# Patient Record
Sex: MALE | Race: WHITE | NOT HISPANIC OR LATINO | Employment: OTHER | URBAN - METROPOLITAN AREA
[De-identification: names, ages, dates, MRNs, and addresses within clinical notes are randomized per-mention and may not be internally consistent; named-entity substitution may affect disease eponyms.]

---

## 2023-08-30 ENCOUNTER — HOSPITAL ENCOUNTER (OUTPATIENT)
Dept: DATA CONVERSION | Facility: HOSPITAL | Age: 88
Discharge: HOME | End: 2023-08-30
Payer: MEDICARE

## 2023-08-30 DIAGNOSIS — E78.5 HYPERLIPIDEMIA, UNSPECIFIED: ICD-10-CM

## 2023-08-30 LAB
ALBUMIN SERPL-MCNC: 4 GM/DL (ref 3.5–5)
ALBUMIN/GLOB SERPL: 1.1 RATIO (ref 1.5–3)
ALP BLD-CCNC: 74 U/L (ref 35–125)
ALT SERPL-CCNC: 15 U/L (ref 5–40)
ANION GAP SERPL CALCULATED.3IONS-SCNC: 12 MMOL/L (ref 0–19)
APPEARANCE PLAS: ABNORMAL
AST SERPL-CCNC: 18 U/L (ref 5–40)
BILIRUB SERPL-MCNC: 0.4 MG/DL (ref 0.1–1.2)
BUN SERPL-MCNC: 14 MG/DL (ref 8–25)
BUN/CREAT SERPL: 11.7 RATIO (ref 8–21)
CALCIUM SERPL-MCNC: 9.1 MG/DL (ref 8.5–10.4)
CHLORIDE SERPL-SCNC: 103 MMOL/L (ref 97–107)
CHOLEST SERPL-MCNC: 123 MG/DL (ref 133–200)
CHOLEST/HDLC SERPL: 3.2 RATIO
CO2 SERPL-SCNC: 23 MMOL/L (ref 24–31)
COLOR SPUN FLD: ABNORMAL
CREAT SERPL-MCNC: 1.2 MG/DL (ref 0.4–1.6)
FASTING STATUS PATIENT QL REPORTED: ABNORMAL
GFR SERPL CREATININE-BSD FRML MDRD: 58 ML/MIN/1.73 M2
GLOBULIN SER-MCNC: 3.5 G/DL (ref 1.9–3.7)
GLUCOSE SERPL-MCNC: 119 MG/DL (ref 65–99)
HDLC SERPL-MCNC: 39 MG/DL
LDLC SERPL CALC-MCNC: 63 MG/DL (ref 65–130)
POTASSIUM SERPL-SCNC: 4.2 MMOL/L (ref 3.4–5.1)
PROT SERPL-MCNC: 7.5 G/DL (ref 5.9–7.9)
SODIUM SERPL-SCNC: 138 MMOL/L (ref 133–145)
TRIGL SERPL-MCNC: 105 MG/DL (ref 40–150)

## 2023-09-13 PROBLEM — N40.1 BENIGN PROSTATIC HYPERPLASIA WITH URINARY OBSTRUCTION: Status: ACTIVE | Noted: 2023-09-13

## 2023-09-13 PROBLEM — A41.9 SEPSIS (MULTI): Status: ACTIVE | Noted: 2023-09-13

## 2023-09-13 PROBLEM — N18.2 STAGE 2 CHRONIC KIDNEY DISEASE: Status: ACTIVE | Noted: 2023-09-13

## 2023-09-13 PROBLEM — N13.8 BENIGN PROSTATIC HYPERPLASIA WITH URINARY OBSTRUCTION: Status: ACTIVE | Noted: 2023-09-13

## 2023-09-13 PROBLEM — Z90.49 HISTORY OF CHOLECYSTECTOMY: Status: ACTIVE | Noted: 2023-09-13

## 2023-09-13 PROBLEM — M54.30 SCIATICA: Status: ACTIVE | Noted: 2023-09-13

## 2023-09-13 PROBLEM — K57.30 DIVERTICULAR DISEASE OF COLON: Status: ACTIVE | Noted: 2023-09-13

## 2023-09-13 PROBLEM — N52.9 ERECTILE DYSFUNCTION: Status: ACTIVE | Noted: 2023-09-13

## 2023-09-13 PROBLEM — K21.9 GASTROESOPHAGEAL REFLUX DISEASE: Status: ACTIVE | Noted: 2023-09-13

## 2023-09-13 PROBLEM — J34.2 DEVIATED NASAL SEPTUM: Status: ACTIVE | Noted: 2023-09-13

## 2023-09-13 PROBLEM — I65.23 OCCLUSION AND STENOSIS OF BILATERAL CAROTID ARTERIES: Status: ACTIVE | Noted: 2023-09-13

## 2023-09-13 PROBLEM — R13.10 DYSPHAGIA: Status: ACTIVE | Noted: 2023-09-13

## 2023-09-13 PROBLEM — L21.9 SEBORRHEIC DERMATITIS: Status: ACTIVE | Noted: 2023-09-13

## 2023-09-13 PROBLEM — I10 HYPERTENSION: Status: ACTIVE | Noted: 2023-09-13

## 2023-09-13 PROBLEM — H93.8X3 PRESSURE SENSATION IN BOTH EARS: Status: ACTIVE | Noted: 2023-09-13

## 2023-09-13 PROBLEM — K85.10 GALLSTONE PANCREATITIS (HHS-HCC): Status: ACTIVE | Noted: 2023-09-13

## 2023-09-13 PROBLEM — R04.0 EPISTAXIS: Status: ACTIVE | Noted: 2023-09-13

## 2023-09-13 PROBLEM — K81.1 CHRONIC CHOLECYSTITIS: Status: ACTIVE | Noted: 2023-09-13

## 2023-09-13 PROBLEM — H61.23 BILATERAL IMPACTED CERUMEN: Status: ACTIVE | Noted: 2023-09-13

## 2023-09-13 PROBLEM — E78.5 HYPERLIPIDEMIA: Status: ACTIVE | Noted: 2023-09-13

## 2023-09-13 PROBLEM — E03.9 HYPOTHYROIDISM: Status: ACTIVE | Noted: 2023-09-13

## 2023-09-13 PROBLEM — R20.9 SKIN SENSATION DISTURBANCE: Status: ACTIVE | Noted: 2023-09-13

## 2023-09-13 PROBLEM — T83.9XXA URINARY CATHETER COMPLICATION (CMS-HCC): Status: ACTIVE | Noted: 2023-09-13

## 2023-09-13 PROBLEM — H90.0 CONDUCTIVE HEARING LOSS OF BOTH MIDDLE EARS: Status: ACTIVE | Noted: 2023-09-13

## 2023-09-13 PROBLEM — R33.9 URINARY RETENTION: Status: ACTIVE | Noted: 2023-09-13

## 2023-09-13 RX ORDER — LISINOPRIL AND HYDROCHLOROTHIAZIDE 20; 25 MG/1; MG/1
1 TABLET ORAL DAILY
COMMUNITY
End: 2024-03-01 | Stop reason: ALTCHOICE

## 2023-09-13 RX ORDER — POLYETHYLENE GLYCOL 3350 17 G/17G
POWDER, FOR SOLUTION ORAL
COMMUNITY

## 2023-09-13 RX ORDER — LOVASTATIN 40 MG/1
1 TABLET ORAL NIGHTLY
COMMUNITY
End: 2023-11-27

## 2023-09-13 RX ORDER — LOVASTATIN 20 MG/1
1 TABLET ORAL NIGHTLY
COMMUNITY
Start: 2015-11-06 | End: 2024-04-02 | Stop reason: ALTCHOICE

## 2023-09-13 RX ORDER — REPAGLINIDE 2 MG/1
1 TABLET ORAL 3 TIMES DAILY
COMMUNITY
End: 2024-01-24

## 2023-09-13 RX ORDER — ACETAMINOPHEN 500 MG
1 TABLET ORAL DAILY
COMMUNITY

## 2023-09-13 RX ORDER — CALCIUM CITRATE/VITAMIN D3 200MG-6.25
1 TABLET ORAL 2 TIMES DAILY
COMMUNITY

## 2023-09-13 RX ORDER — METFORMIN HYDROCHLORIDE 500 MG/1
1 TABLET ORAL DAILY
COMMUNITY
End: 2023-12-13 | Stop reason: ALTCHOICE

## 2023-09-13 RX ORDER — BENZONATATE 200 MG/1
1 CAPSULE ORAL 3 TIMES DAILY PRN
COMMUNITY
Start: 2023-03-06 | End: 2023-12-13 | Stop reason: ALTCHOICE

## 2023-09-13 RX ORDER — FERROUS SULFATE 325(65) MG
1 TABLET ORAL
COMMUNITY
End: 2024-03-01 | Stop reason: ALTCHOICE

## 2023-09-13 RX ORDER — LANCETS 28 GAUGE
1 EACH MISCELLANEOUS 2 TIMES DAILY
COMMUNITY
Start: 2022-11-17

## 2023-09-13 RX ORDER — NYSTATIN 100000 [USP'U]/ML
4 SUSPENSION ORAL 4 TIMES DAILY
COMMUNITY
End: 2023-12-13 | Stop reason: ALTCHOICE

## 2023-09-13 RX ORDER — METFORMIN HYDROCHLORIDE 500 MG/1
2 TABLET, EXTENDED RELEASE ORAL
COMMUNITY
End: 2023-12-11

## 2023-09-13 RX ORDER — IBUPROFEN 200 MG
1 TABLET ORAL 3 TIMES DAILY
COMMUNITY
End: 2024-03-01 | Stop reason: ALTCHOICE

## 2023-09-13 RX ORDER — OMEPRAZOLE 40 MG/1
1 CAPSULE, DELAYED RELEASE ORAL DAILY
COMMUNITY
End: 2023-12-25

## 2023-09-13 RX ORDER — NAPROXEN SODIUM 220 MG/1
1 TABLET, FILM COATED ORAL DAILY
COMMUNITY
Start: 2015-11-06

## 2023-09-13 RX ORDER — INSULIN GLARGINE 100 [IU]/ML
19 INJECTION, SOLUTION SUBCUTANEOUS NIGHTLY
COMMUNITY
Start: 2015-11-06

## 2023-09-13 RX ORDER — METRONIDAZOLE 500 MG/1
1 TABLET ORAL 3 TIMES DAILY
COMMUNITY
End: 2023-12-13 | Stop reason: ALTCHOICE

## 2023-09-13 RX ORDER — LEVOTHYROXINE SODIUM 88 UG/1
1 TABLET ORAL
COMMUNITY
Start: 2015-11-06 | End: 2023-11-27

## 2023-09-13 RX ORDER — LISINOPRIL 20 MG/1
1 TABLET ORAL DAILY
COMMUNITY
Start: 2015-11-06 | End: 2024-03-01 | Stop reason: ALTCHOICE

## 2023-09-13 RX ORDER — ALBUTEROL SULFATE 90 UG/1
1 AEROSOL, METERED RESPIRATORY (INHALATION)
COMMUNITY
Start: 2023-03-09 | End: 2024-03-01 | Stop reason: ALTCHOICE

## 2023-09-13 RX ORDER — METFORMIN HYDROCHLORIDE 500 MG/1
2 TABLET ORAL 2 TIMES DAILY
COMMUNITY
Start: 2015-11-06 | End: 2024-04-01 | Stop reason: SDUPTHER

## 2023-09-13 RX ORDER — CIPROFLOXACIN 500 MG/1
1 TABLET ORAL EVERY 12 HOURS
COMMUNITY
End: 2023-12-13 | Stop reason: ALTCHOICE

## 2023-09-13 RX ORDER — SEMAGLUTIDE 1.34 MG/ML
INJECTION, SOLUTION SUBCUTANEOUS
COMMUNITY
End: 2024-03-01 | Stop reason: ALTCHOICE

## 2023-09-13 RX ORDER — PEN NEEDLE, DIABETIC 30 GX3/16"
NEEDLE, DISPOSABLE MISCELLANEOUS DAILY
COMMUNITY

## 2023-09-13 RX ORDER — TAMSULOSIN HYDROCHLORIDE 0.4 MG/1
1 CAPSULE ORAL DAILY
COMMUNITY
End: 2023-10-03 | Stop reason: ALTCHOICE

## 2023-09-13 RX ORDER — PANTOPRAZOLE SODIUM 40 MG/1
1 TABLET, DELAYED RELEASE ORAL DAILY
COMMUNITY
End: 2023-12-13 | Stop reason: ALTCHOICE

## 2023-09-13 RX ORDER — ATORVASTATIN CALCIUM 40 MG/1
1 TABLET, FILM COATED ORAL DAILY
COMMUNITY
End: 2023-10-03 | Stop reason: ALTCHOICE

## 2023-10-03 ENCOUNTER — TELEMEDICINE (OUTPATIENT)
Dept: PRIMARY CARE | Facility: CLINIC | Age: 88
End: 2023-10-03
Payer: MEDICARE

## 2023-10-03 DIAGNOSIS — U07.1 COVID-19: Primary | ICD-10-CM

## 2023-10-03 PROCEDURE — 99442 PR PHYS/QHP TELEPHONE EVALUATION 11-20 MIN: CPT | Performed by: FAMILY MEDICINE

## 2023-10-03 RX ORDER — NIRMATRELVIR AND RITONAVIR 150-100 MG
2 KIT ORAL 2 TIMES DAILY
Qty: 20 TABLET | Refills: 0 | Status: SHIPPED | OUTPATIENT
Start: 2023-10-03 | End: 2023-10-08

## 2023-10-03 ASSESSMENT — ENCOUNTER SYMPTOMS
COUGH: 1
SINUS PAIN: 1

## 2023-10-03 NOTE — PROGRESS NOTES
Subjective   Patient ID: Price Perez is a 89 y.o. male who presents for No chief complaint on file..    URI   This is a new (covid test + today, sxs began today) problem. The current episode started today. There has been no fever. The fever has been present for Less than 1 day. Associated symptoms include coughing and sinus pain. Pertinent negatives include no congestion. He has tried nothing for the symptoms. The treatment provided no relief.    This is a telephone encounter to determine if a face to face visit is needed.  Patient verbally consents to such a visit,  Total time was ___11_____ minutes>   Review of Systems   HENT:  Positive for sinus pain. Negative for congestion.    Respiratory:  Positive for cough.        Objective   There were no vitals taken for this visit.    Physical Exam  Vitals and nursing note reviewed.     No exam    Assessment/Plan   Diagnoses and all orders for this visit:  COVID-19  -     nirmatrelvir-ritonavir (Paxlovid) 150-100 mg tablet therapy pack; Take 2 tablets by mouth 2 times a day for 5 days. Follow the instructions on the package  Counseled pt on to hold both tamsulosin and atorvistatin while on paxlovid

## 2023-11-07 ENCOUNTER — OFFICE VISIT (OUTPATIENT)
Dept: PRIMARY CARE | Facility: CLINIC | Age: 88
End: 2023-11-07
Payer: MEDICARE

## 2023-11-07 VITALS
BODY MASS INDEX: 26.74 KG/M2 | SYSTOLIC BLOOD PRESSURE: 138 MMHG | WEIGHT: 170.4 LBS | OXYGEN SATURATION: 95 % | DIASTOLIC BLOOD PRESSURE: 64 MMHG | HEIGHT: 67 IN | HEART RATE: 94 BPM | TEMPERATURE: 97.9 F

## 2023-11-07 DIAGNOSIS — J21.9 ACUTE BRONCHIOLITIS DUE TO UNSPECIFIED ORGANISM: Primary | ICD-10-CM

## 2023-11-07 PROBLEM — E78.5 HYPERLIPIDEMIA: Chronic | Status: ACTIVE | Noted: 2023-09-13

## 2023-11-07 PROBLEM — I65.23 OCCLUSION AND STENOSIS OF BILATERAL CAROTID ARTERIES: Chronic | Status: ACTIVE | Noted: 2023-09-13

## 2023-11-07 PROBLEM — N18.2 STAGE 2 CHRONIC KIDNEY DISEASE: Chronic | Status: ACTIVE | Noted: 2023-09-13

## 2023-11-07 PROBLEM — E03.9 HYPOTHYROIDISM: Chronic | Status: ACTIVE | Noted: 2023-09-13

## 2023-11-07 PROBLEM — R04.0 EPISTAXIS: Status: RESOLVED | Noted: 2023-09-13 | Resolved: 2023-11-07

## 2023-11-07 PROBLEM — H61.23 BILATERAL IMPACTED CERUMEN: Status: RESOLVED | Noted: 2023-09-13 | Resolved: 2023-11-07

## 2023-11-07 PROBLEM — K85.10 GALLSTONE PANCREATITIS (HHS-HCC): Status: RESOLVED | Noted: 2023-09-13 | Resolved: 2023-11-07

## 2023-11-07 PROBLEM — A41.9 SEPSIS (MULTI): Status: RESOLVED | Noted: 2023-09-13 | Resolved: 2023-11-07

## 2023-11-07 PROBLEM — K81.1 CHRONIC CHOLECYSTITIS: Status: RESOLVED | Noted: 2023-09-13 | Resolved: 2023-11-07

## 2023-11-07 PROBLEM — R33.9 URINARY RETENTION: Status: RESOLVED | Noted: 2023-09-13 | Resolved: 2023-11-07

## 2023-11-07 PROBLEM — L21.9 SEBORRHEIC DERMATITIS: Status: RESOLVED | Noted: 2023-09-13 | Resolved: 2023-11-07

## 2023-11-07 PROBLEM — M54.30 SCIATICA: Status: RESOLVED | Noted: 2023-09-13 | Resolved: 2023-11-07

## 2023-11-07 PROBLEM — K21.9 GASTROESOPHAGEAL REFLUX DISEASE: Chronic | Status: ACTIVE | Noted: 2023-09-13

## 2023-11-07 PROBLEM — K57.30 DIVERTICULAR DISEASE OF COLON: Status: RESOLVED | Noted: 2023-09-13 | Resolved: 2023-11-07

## 2023-11-07 PROBLEM — R13.10 DYSPHAGIA: Status: RESOLVED | Noted: 2023-09-13 | Resolved: 2023-11-07

## 2023-11-07 PROBLEM — I10 HYPERTENSION: Chronic | Status: ACTIVE | Noted: 2023-09-13

## 2023-11-07 PROBLEM — N13.8 BENIGN PROSTATIC HYPERPLASIA WITH URINARY OBSTRUCTION: Chronic | Status: ACTIVE | Noted: 2023-09-13

## 2023-11-07 PROBLEM — N52.9 ERECTILE DYSFUNCTION: Chronic | Status: ACTIVE | Noted: 2023-09-13

## 2023-11-07 PROBLEM — R20.9 SKIN SENSATION DISTURBANCE: Status: RESOLVED | Noted: 2023-09-13 | Resolved: 2023-11-07

## 2023-11-07 PROBLEM — Z90.49 HISTORY OF CHOLECYSTECTOMY: Status: RESOLVED | Noted: 2023-09-13 | Resolved: 2023-11-07

## 2023-11-07 PROBLEM — N40.1 BENIGN PROSTATIC HYPERPLASIA WITH URINARY OBSTRUCTION: Chronic | Status: ACTIVE | Noted: 2023-09-13

## 2023-11-07 PROBLEM — J34.2 DEVIATED NASAL SEPTUM: Status: RESOLVED | Noted: 2023-09-13 | Resolved: 2023-11-07

## 2023-11-07 PROBLEM — H93.8X3 PRESSURE SENSATION IN BOTH EARS: Status: RESOLVED | Noted: 2023-09-13 | Resolved: 2023-11-07

## 2023-11-07 PROCEDURE — 99213 OFFICE O/P EST LOW 20 MIN: CPT | Performed by: FAMILY MEDICINE

## 2023-11-07 PROCEDURE — 1159F MED LIST DOCD IN RCRD: CPT | Performed by: FAMILY MEDICINE

## 2023-11-07 PROCEDURE — 1036F TOBACCO NON-USER: CPT | Performed by: FAMILY MEDICINE

## 2023-11-07 PROCEDURE — 1125F AMNT PAIN NOTED PAIN PRSNT: CPT | Performed by: FAMILY MEDICINE

## 2023-11-07 PROCEDURE — 3075F SYST BP GE 130 - 139MM HG: CPT | Performed by: FAMILY MEDICINE

## 2023-11-07 PROCEDURE — 3078F DIAST BP <80 MM HG: CPT | Performed by: FAMILY MEDICINE

## 2023-11-07 RX ORDER — DOXYCYCLINE 100 MG/1
100 CAPSULE ORAL 2 TIMES DAILY
Qty: 20 CAPSULE | Refills: 0 | Status: SHIPPED | OUTPATIENT
Start: 2023-11-07 | End: 2023-11-17

## 2023-11-07 RX ORDER — BENZONATATE 200 MG/1
200 CAPSULE ORAL 3 TIMES DAILY PRN
Qty: 42 CAPSULE | Refills: 0 | Status: SHIPPED | OUTPATIENT
Start: 2023-11-07 | End: 2023-11-27

## 2023-11-07 ASSESSMENT — ENCOUNTER SYMPTOMS
SHORTNESS OF BREATH: 0
SWEATS: 0
CHILLS: 0
COUGH: 1
SORE THROAT: 0
LOSS OF SENSATION IN FEET: 0
WHEEZING: 0
OCCASIONAL FEELINGS OF UNSTEADINESS: 0
RHINORRHEA: 1
DEPRESSION: 0

## 2023-11-07 ASSESSMENT — PATIENT HEALTH QUESTIONNAIRE - PHQ9
SUM OF ALL RESPONSES TO PHQ9 QUESTIONS 1 AND 2: 0
2. FEELING DOWN, DEPRESSED OR HOPELESS: NOT AT ALL
1. LITTLE INTEREST OR PLEASURE IN DOING THINGS: NOT AT ALL

## 2023-11-07 ASSESSMENT — LIFESTYLE VARIABLES
HOW OFTEN DO YOU HAVE SIX OR MORE DRINKS ON ONE OCCASION: NEVER
AUDIT-C TOTAL SCORE: 0
HOW MANY STANDARD DRINKS CONTAINING ALCOHOL DO YOU HAVE ON A TYPICAL DAY: PATIENT DOES NOT DRINK
SKIP TO QUESTIONS 9-10: 1
HOW OFTEN DO YOU HAVE A DRINK CONTAINING ALCOHOL: NEVER

## 2023-11-07 ASSESSMENT — PAIN SCALES - GENERAL: PAINLEVEL: 5

## 2023-11-07 NOTE — PROGRESS NOTES
"Subjective   Patient ID: Price Perez is a 89 y.o. male who presents for Cough.    Cough  This is a new problem. The current episode started in the past 7 days. The problem has been waxing and waning. The cough is Productive of purulent sputum. Associated symptoms include rhinorrhea. Pertinent negatives include no chest pain, chills, ear congestion, sore throat, shortness of breath, sweats or wheezing.    Had covid oct 1    Review of Systems   Constitutional:  Negative for chills.   HENT:  Positive for rhinorrhea. Negative for sore throat.    Respiratory:  Positive for cough. Negative for shortness of breath and wheezing.    Cardiovascular:  Negative for chest pain.       Objective   /64 (BP Location: Left arm, Patient Position: Sitting, BP Cuff Size: Adult)   Pulse 94   Temp 36.6 °C (97.9 °F) (Oral)   Ht 1.702 m (5' 7\")   Wt 77.3 kg (170 lb 6.4 oz)   SpO2 95%   BMI 26.69 kg/m²     Physical Exam  Vitals reviewed.   Constitutional:       Appearance: Normal appearance.   HENT:      Head: Normocephalic and atraumatic.      Jaw: There is normal jaw occlusion.      Right Ear: Tympanic membrane and ear canal normal.      Left Ear: Tympanic membrane and ear canal normal.      Mouth/Throat:      Mouth: Mucous membranes are moist.      Pharynx: Oropharynx is clear. Uvula midline.   Cardiovascular:      Rate and Rhythm: Normal rate and regular rhythm.      Heart sounds: Normal heart sounds. No murmur heard.  Pulmonary:      Breath sounds: Normal breath sounds.   Abdominal:      General: Abdomen is flat. Bowel sounds are normal.      Palpations: Abdomen is soft.   Musculoskeletal:         General: No swelling.   Neurological:      Mental Status: He is alert.         Assessment/Plan   Diagnoses and all orders for this visit:  Acute bronchiolitis due to unspecified organism  -     doxycycline (Vibramycin) 100 mg capsule; Take 1 capsule (100 mg) by mouth 2 times a day for 10 days. Take with at least 8 ounces (large " glass) of water, do not lie down for 30 minutes after  -     benzonatate (Tessalon) 200 mg capsule; Take 1 capsule (200 mg) by mouth 3 times a day as needed for cough. Do not crush or chew.

## 2023-11-27 DIAGNOSIS — E78.2 MIXED HYPERLIPIDEMIA: Primary | Chronic | ICD-10-CM

## 2023-11-27 DIAGNOSIS — E03.9 ACQUIRED HYPOTHYROIDISM: Primary | Chronic | ICD-10-CM

## 2023-11-27 DIAGNOSIS — J21.9 ACUTE BRONCHIOLITIS DUE TO UNSPECIFIED ORGANISM: ICD-10-CM

## 2023-11-27 RX ORDER — BENZONATATE 200 MG/1
CAPSULE ORAL
Qty: 42 CAPSULE | Refills: 0 | Status: SHIPPED | OUTPATIENT
Start: 2023-11-27 | End: 2023-12-13 | Stop reason: ALTCHOICE

## 2023-11-27 RX ORDER — LEVOTHYROXINE SODIUM 88 UG/1
88 TABLET ORAL
Qty: 90 TABLET | Refills: 1 | Status: SHIPPED | OUTPATIENT
Start: 2023-11-27 | End: 2024-05-17

## 2023-11-27 RX ORDER — LOVASTATIN 40 MG/1
40 TABLET ORAL
Qty: 90 TABLET | Refills: 0 | Status: SHIPPED | OUTPATIENT
Start: 2023-11-27 | End: 2023-12-13 | Stop reason: ALTCHOICE

## 2023-12-11 DIAGNOSIS — E11.9 TYPE 2 DIABETES MELLITUS WITHOUT COMPLICATION, WITHOUT LONG-TERM CURRENT USE OF INSULIN (MULTI): Primary | ICD-10-CM

## 2023-12-11 RX ORDER — METFORMIN HYDROCHLORIDE 500 MG/1
TABLET, EXTENDED RELEASE ORAL
Qty: 180 TABLET | Refills: 1 | Status: SHIPPED | OUTPATIENT
Start: 2023-12-11 | End: 2024-03-01 | Stop reason: ALTCHOICE

## 2023-12-13 ENCOUNTER — TELEPHONE (OUTPATIENT)
Dept: PRIMARY CARE | Facility: CLINIC | Age: 88
End: 2023-12-13

## 2023-12-13 ENCOUNTER — OFFICE VISIT (OUTPATIENT)
Dept: PRIMARY CARE | Facility: CLINIC | Age: 88
End: 2023-12-13
Payer: MEDICARE

## 2023-12-13 ENCOUNTER — HOSPITAL ENCOUNTER (OUTPATIENT)
Dept: RADIOLOGY | Facility: HOSPITAL | Age: 88
Discharge: HOME | End: 2023-12-13
Payer: MEDICARE

## 2023-12-13 VITALS
OXYGEN SATURATION: 94 % | WEIGHT: 167 LBS | TEMPERATURE: 97.8 F | SYSTOLIC BLOOD PRESSURE: 122 MMHG | BODY MASS INDEX: 23.91 KG/M2 | HEART RATE: 84 BPM | DIASTOLIC BLOOD PRESSURE: 58 MMHG | HEIGHT: 70 IN

## 2023-12-13 DIAGNOSIS — R05.1 ACUTE COUGH: ICD-10-CM

## 2023-12-13 DIAGNOSIS — J20.9 ACUTE BRONCHITIS, UNSPECIFIED ORGANISM: ICD-10-CM

## 2023-12-13 DIAGNOSIS — J20.9 ACUTE BRONCHITIS, UNSPECIFIED ORGANISM: Primary | ICD-10-CM

## 2023-12-13 PROCEDURE — 3074F SYST BP LT 130 MM HG: CPT | Performed by: FAMILY MEDICINE

## 2023-12-13 PROCEDURE — 1036F TOBACCO NON-USER: CPT | Performed by: FAMILY MEDICINE

## 2023-12-13 PROCEDURE — 1126F AMNT PAIN NOTED NONE PRSNT: CPT | Performed by: FAMILY MEDICINE

## 2023-12-13 PROCEDURE — 1159F MED LIST DOCD IN RCRD: CPT | Performed by: FAMILY MEDICINE

## 2023-12-13 PROCEDURE — 3078F DIAST BP <80 MM HG: CPT | Performed by: FAMILY MEDICINE

## 2023-12-13 PROCEDURE — 71046 X-RAY EXAM CHEST 2 VIEWS: CPT

## 2023-12-13 PROCEDURE — 99213 OFFICE O/P EST LOW 20 MIN: CPT | Performed by: FAMILY MEDICINE

## 2023-12-13 PROCEDURE — 1160F RVW MEDS BY RX/DR IN RCRD: CPT | Performed by: FAMILY MEDICINE

## 2023-12-13 RX ORDER — AZITHROMYCIN 250 MG/1
TABLET, FILM COATED ORAL
Qty: 6 TABLET | Refills: 0 | Status: SHIPPED | OUTPATIENT
Start: 2023-12-13 | End: 2023-12-18

## 2023-12-13 RX ORDER — BENZONATATE 200 MG/1
CAPSULE ORAL
Qty: 42 CAPSULE | Refills: 0 | Status: SHIPPED | OUTPATIENT
Start: 2023-12-13 | End: 2024-03-01 | Stop reason: ALTCHOICE

## 2023-12-13 ASSESSMENT — ENCOUNTER SYMPTOMS
LOSS OF SENSATION IN FEET: 0
WHEEZING: 0
SWEATS: 0
DEPRESSION: 0
SHORTNESS OF BREATH: 0
SORE THROAT: 0
COUGH: 1
OCCASIONAL FEELINGS OF UNSTEADINESS: 0
HEMOPTYSIS: 0
HEADACHES: 0
FEVER: 0

## 2023-12-13 ASSESSMENT — PATIENT HEALTH QUESTIONNAIRE - PHQ9
1. LITTLE INTEREST OR PLEASURE IN DOING THINGS: NOT AT ALL
SUM OF ALL RESPONSES TO PHQ9 QUESTIONS 1 AND 2: 0
2. FEELING DOWN, DEPRESSED OR HOPELESS: NOT AT ALL

## 2023-12-13 ASSESSMENT — PAIN SCALES - GENERAL: PAINLEVEL: 0-NO PAIN

## 2023-12-13 NOTE — PROGRESS NOTES
"Subjective   Patient ID: Price Perez is a 89 y.o. male who presents for Cough.    Cough  This is a new problem. The current episode started in the past 7 days. The cough is Non-productive. Pertinent negatives include no chest pain, ear congestion, ear pain, fever, headaches, hemoptysis, nasal congestion, rash, sore throat, shortness of breath, sweats or wheezing.    - sxs began the last week, had COVID 6 weeks ago  Got over that and now symptoms have come back with a cough of productive sputum.  Denies shortness of breath or hemoptysis using Tessalon Perles without relief of symptoms.  Does have an albuterol inhaler and uses it periodically    Review of Systems   Constitutional:  Negative for fever.   HENT:  Negative for ear pain and sore throat.    Respiratory:  Positive for cough. Negative for hemoptysis, shortness of breath and wheezing.    Cardiovascular:  Negative for chest pain.   Skin:  Negative for rash.   Neurological:  Negative for headaches.       Objective   /58 (BP Location: Right arm)   Pulse 84   Temp 36.6 °C (97.8 °F) (Temporal)   Ht 1.778 m (5' 10\")   Wt 75.8 kg (167 lb)   SpO2 94%   BMI 23.96 kg/m²     Physical Exam  Vitals reviewed.   Constitutional:       Appearance: Normal appearance.   Cardiovascular:      Rate and Rhythm: Normal rate and regular rhythm.      Heart sounds: Normal heart sounds. No murmur heard.  Pulmonary:      Breath sounds: Normal breath sounds.   Abdominal:      General: Abdomen is flat. Bowel sounds are normal.      Palpations: Abdomen is soft.   Musculoskeletal:         General: No swelling.   Neurological:      Mental Status: He is alert.         Assessment/Plan   Diagnoses and all orders for this visit:  Acute bronchitis, unspecified organism  -     XR chest 2 views; Future  -     azithromycin (Zithromax) 250 mg tablet; Take 2 tablets (500 mg) by mouth once daily for 1 day, THEN 1 tablet (250 mg) once daily for 4 days. Take 2 tabs (500 mg) by mouth today, " than 1 daily for 4 days..  -     benzonatate (Tessalon) 200 mg capsule; TAKE 1 CAPSULE BY MOUTH THREE TIMES DAILY AS NEEDED FOR COUGH (DO NOT CRUSH OR CHEW )  Acute cough    Recommend he uses albuterol as needed  He appears in no acute respiratory distress  His lung exam was normal  Follow-up on x-ray results  His cough may linger on another 2 to 6 weeks  If symptoms are worsening return to office

## 2023-12-13 NOTE — TELEPHONE ENCOUNTER
Pt's son in law Oswaldo Gonzalez is calling for a SDA for dad. Price was here 11/7/23 for Bronchitis and it seems like it has progressed, possible pneumonia again.. He has a very dry cough, and sob. ED was suggested, female in the background said Price will not go to the ED.. Please advise and call oswaldo [phone]

## 2023-12-23 DIAGNOSIS — K21.9 GASTROESOPHAGEAL REFLUX DISEASE, UNSPECIFIED WHETHER ESOPHAGITIS PRESENT: Primary | Chronic | ICD-10-CM

## 2023-12-25 RX ORDER — OMEPRAZOLE 40 MG/1
CAPSULE, DELAYED RELEASE ORAL
Qty: 90 CAPSULE | Refills: 1 | Status: SHIPPED | OUTPATIENT
Start: 2023-12-25

## 2024-01-24 DIAGNOSIS — E11.9 TYPE 2 DIABETES MELLITUS WITHOUT COMPLICATION, WITHOUT LONG-TERM CURRENT USE OF INSULIN (MULTI): Primary | ICD-10-CM

## 2024-01-24 RX ORDER — REPAGLINIDE 2 MG/1
2 TABLET ORAL 3 TIMES DAILY
Qty: 270 TABLET | Refills: 1 | Status: SHIPPED | OUTPATIENT
Start: 2024-01-24 | End: 2024-03-08 | Stop reason: SDUPTHER

## 2024-03-01 ENCOUNTER — OFFICE VISIT (OUTPATIENT)
Dept: PRIMARY CARE | Facility: CLINIC | Age: 89
End: 2024-03-01
Payer: MEDICARE

## 2024-03-01 VITALS
WEIGHT: 169 LBS | HEART RATE: 80 BPM | SYSTOLIC BLOOD PRESSURE: 122 MMHG | BODY MASS INDEX: 24.2 KG/M2 | DIASTOLIC BLOOD PRESSURE: 70 MMHG | TEMPERATURE: 97.8 F | HEIGHT: 70 IN | OXYGEN SATURATION: 98 %

## 2024-03-01 DIAGNOSIS — N18.31 TYPE 2 DIABETES MELLITUS WITH STAGE 3A CHRONIC KIDNEY DISEASE, WITH LONG-TERM CURRENT USE OF INSULIN (MULTI): ICD-10-CM

## 2024-03-01 DIAGNOSIS — Z00.00 ROUTINE GENERAL MEDICAL EXAMINATION AT HEALTH CARE FACILITY: Primary | ICD-10-CM

## 2024-03-01 DIAGNOSIS — E78.2 MIXED HYPERLIPIDEMIA: Chronic | ICD-10-CM

## 2024-03-01 DIAGNOSIS — K21.9 GASTROESOPHAGEAL REFLUX DISEASE, UNSPECIFIED WHETHER ESOPHAGITIS PRESENT: Chronic | ICD-10-CM

## 2024-03-01 DIAGNOSIS — E11.22 TYPE 2 DIABETES MELLITUS WITH STAGE 3A CHRONIC KIDNEY DISEASE, WITH LONG-TERM CURRENT USE OF INSULIN (MULTI): ICD-10-CM

## 2024-03-01 DIAGNOSIS — E11.9 TYPE 2 DIABETES MELLITUS WITHOUT COMPLICATION, WITH LONG-TERM CURRENT USE OF INSULIN (MULTI): ICD-10-CM

## 2024-03-01 DIAGNOSIS — Z79.4 TYPE 2 DIABETES MELLITUS WITH STAGE 3A CHRONIC KIDNEY DISEASE, WITH LONG-TERM CURRENT USE OF INSULIN (MULTI): ICD-10-CM

## 2024-03-01 DIAGNOSIS — I10 PRIMARY HYPERTENSION: Chronic | ICD-10-CM

## 2024-03-01 DIAGNOSIS — Z79.4 TYPE 2 DIABETES MELLITUS WITHOUT COMPLICATION, WITH LONG-TERM CURRENT USE OF INSULIN (MULTI): ICD-10-CM

## 2024-03-01 DIAGNOSIS — E03.9 ACQUIRED HYPOTHYROIDISM: Chronic | ICD-10-CM

## 2024-03-01 LAB — POC HEMOGLOBIN A1C: 8 % (ref 4.2–6.5)

## 2024-03-01 PROCEDURE — 3074F SYST BP LT 130 MM HG: CPT | Performed by: FAMILY MEDICINE

## 2024-03-01 PROCEDURE — 1036F TOBACCO NON-USER: CPT | Performed by: FAMILY MEDICINE

## 2024-03-01 PROCEDURE — 1159F MED LIST DOCD IN RCRD: CPT | Performed by: FAMILY MEDICINE

## 2024-03-01 PROCEDURE — G0439 PPPS, SUBSEQ VISIT: HCPCS | Performed by: FAMILY MEDICINE

## 2024-03-01 PROCEDURE — 1126F AMNT PAIN NOTED NONE PRSNT: CPT | Performed by: FAMILY MEDICINE

## 2024-03-01 PROCEDURE — 99214 OFFICE O/P EST MOD 30 MIN: CPT | Performed by: FAMILY MEDICINE

## 2024-03-01 PROCEDURE — 99215 OFFICE O/P EST HI 40 MIN: CPT | Performed by: FAMILY MEDICINE

## 2024-03-01 PROCEDURE — 3078F DIAST BP <80 MM HG: CPT | Performed by: FAMILY MEDICINE

## 2024-03-01 PROCEDURE — 1157F ADVNC CARE PLAN IN RCRD: CPT | Performed by: FAMILY MEDICINE

## 2024-03-01 PROCEDURE — 83036 HEMOGLOBIN GLYCOSYLATED A1C: CPT | Mod: MUE | Performed by: FAMILY MEDICINE

## 2024-03-01 PROCEDURE — 1160F RVW MEDS BY RX/DR IN RCRD: CPT | Performed by: FAMILY MEDICINE

## 2024-03-01 PROCEDURE — 1170F FXNL STATUS ASSESSED: CPT | Performed by: FAMILY MEDICINE

## 2024-03-01 RX ORDER — SEMAGLUTIDE 1.34 MG/ML
1 INJECTION, SOLUTION SUBCUTANEOUS
COMMUNITY

## 2024-03-01 ASSESSMENT — ACTIVITIES OF DAILY LIVING (ADL)
TAKING MEDICATION: INDEPENDENT
USING TELEPHONE: INDEPENDENT
GROCERY SHOPPING: INDEPENDENT
BATHING: INDEPENDENT
DOING HOUSEWORK: INDEPENDENT
STIL DRIVING: NO
ADEQUATE_TO_COMPLETE_ADL: YES
BATHING: INDEPENDENT
WALKS IN HOME: INDEPENDENT
DRESSING YOURSELF: INDEPENDENT
DRESSING: INDEPENDENT
FEEDING YOURSELF: INDEPENDENT
MANAGING FINANCES: INDEPENDENT
EATING: INDEPENDENT
PREPARING MEALS: INDEPENDENT
HEARING - LEFT EAR: FUNCTIONAL
HEARING - RIGHT EAR: FUNCTIONAL
JUDGMENT_ADEQUATE_SAFELY_COMPLETE_DAILY_ACTIVITIES: YES
PATIENT'S MEMORY ADEQUATE TO SAFELY COMPLETE DAILY ACTIVITIES?: YES
TOILETING: INDEPENDENT
USING TRANSPORTATION: INDEPENDENT
GROOMING: INDEPENDENT
NEEDS ASSISTANCE WITH FOOD: INDEPENDENT
PILL BOX USED: YES

## 2024-03-01 ASSESSMENT — ENCOUNTER SYMPTOMS
PSYCHIATRIC NEGATIVE: 1
OCCASIONAL FEELINGS OF UNSTEADINESS: 0
LOSS OF SENSATION IN FEET: 0
MUSCULOSKELETAL NEGATIVE: 1
RESPIRATORY NEGATIVE: 1
EYES NEGATIVE: 1
GASTROINTESTINAL NEGATIVE: 1
CONSTITUTIONAL NEGATIVE: 1
NEUROLOGICAL NEGATIVE: 1
ALLERGIC/IMMUNOLOGIC NEGATIVE: 1
ENDOCRINE NEGATIVE: 1
DEPRESSION: 0

## 2024-03-01 ASSESSMENT — PAIN SCALES - GENERAL: PAINLEVEL: 0-NO PAIN

## 2024-03-01 NOTE — PROGRESS NOTES
"Subjective   Reason for Visit: Price Perez is an 89 y.o. male here for a Medicare Wellness visit.     Past Medical, Surgical, and Family History reviewed and updated in chart.    Reviewed all medications by prescribing practitioner or clinical pharmacist (such as prescriptions, OTCs, herbal therapies and supplements) and documented in the medical record.    HPI  Hypertension:          c/o Patient here for F/U. stable on meds.          Denies : Chest pain.          Denies : Palpitations.          Denies : Syncope.   Hyperlipidemia:          c/o Patient here for F/U. doing well and without complaints, tolerating medicine well.   Hypothyroidism:          c/o Follow Up stable, taking hormone supplement routinely.   DM2:   -Chronic , stable  -A1c today: 8  - Frequency of blood glucose monitorin x day  -Hypoglycemic episodes: infrequent  -Concerns:  none  -Tolerating meds: yes  -[facility]  -Foot Exam: (recommend yearly) 3/1/24    Patient Care Team:  Andres Lopez MD as PCP - General (Family Medicine)     Review of Systems   Constitutional: Negative.    HENT: Negative.     Eyes: Negative.    Respiratory: Negative.     Gastrointestinal: Negative.    Endocrine: Negative.    Genitourinary: Negative.    Musculoskeletal: Negative.    Skin: Negative.    Allergic/Immunologic: Negative.    Neurological: Negative.    Psychiatric/Behavioral: Negative.         Objective   Vitals:  /70 (BP Location: Right arm)   Pulse 80   Temp 36.6 °C (97.8 °F) (Temporal)   Ht 1.778 m (5' 10\")   Wt 76.7 kg (169 lb)   SpO2 98%   BMI 24.25 kg/m²       Physical Exam  Vitals reviewed.   Constitutional:       Appearance: Normal appearance.   HENT:      Right Ear: Tympanic membrane, ear canal and external ear normal.      Left Ear: Tympanic membrane, ear canal and external ear normal.      Nose: Nose normal.      Mouth/Throat:      Mouth: Mucous membranes are moist.   Eyes:      Extraocular " Movements: Extraocular movements intact.      Conjunctiva/sclera: Conjunctivae normal.      Pupils: Pupils are equal, round, and reactive to light.   Cardiovascular:      Rate and Rhythm: Normal rate and regular rhythm.      Pulses: Normal pulses.           Dorsalis pedis pulses are 2+ on the right side and 2+ on the left side.        Posterior tibial pulses are 2+ on the right side and 2+ on the left side.      Heart sounds: Normal heart sounds.   Pulmonary:      Breath sounds: Normal breath sounds.   Abdominal:      General: Abdomen is flat. Bowel sounds are normal.      Palpations: Abdomen is soft.   Musculoskeletal:         General: Normal range of motion.      Cervical back: Neck supple.   Feet:      Right foot:      Protective Sensation: 6 sites tested.        Skin integrity: Skin integrity normal.      Left foot:      Protective Sensation: 6 sites tested.        Skin integrity: Skin integrity normal.   Skin:     General: Skin is warm.   Neurological:      General: No focal deficit present.      Mental Status: He is alert and oriented to person, place, and time.      Cranial Nerves: Cranial nerves 2-12 are intact.   Psychiatric:         Attention and Perception: Attention normal.         Mood and Affect: Mood normal.         Assessment/Plan   Problem List Items Addressed This Visit       Gastroesophageal reflux disease (Chronic)    Hypothyroidism (Chronic)    Mixed hyperlipidemia (Chronic)    Primary hypertension (Chronic)    Type 2 diabetes mellitus with chronic kidney disease, with long-term current use of insulin, unspecified CKD stage (CMS/HCC)     Other Visit Diagnoses       Routine general medical examination at health care facility    -  Primary    Relevant Orders    1 Year Follow Up In Primary Care - Wellness Exam    Type 2 diabetes mellitus without complication, with long-term current use of insulin (CMS/HCC)

## 2024-03-04 ENCOUNTER — LAB (OUTPATIENT)
Dept: LAB | Facility: LAB | Age: 89
End: 2024-03-04
Payer: MEDICARE

## 2024-03-04 DIAGNOSIS — E11.9 TYPE 2 DIABETES MELLITUS WITHOUT COMPLICATION, WITH LONG-TERM CURRENT USE OF INSULIN (MULTI): ICD-10-CM

## 2024-03-04 DIAGNOSIS — Z79.4 TYPE 2 DIABETES MELLITUS WITHOUT COMPLICATION, WITH LONG-TERM CURRENT USE OF INSULIN (MULTI): ICD-10-CM

## 2024-03-04 LAB
ALBUMIN SERPL-MCNC: 4.1 G/DL (ref 3.5–5)
ALP BLD-CCNC: 79 U/L (ref 35–125)
ALT SERPL-CCNC: 16 U/L (ref 5–40)
ANION GAP SERPL CALC-SCNC: 11 MMOL/L
APPEARANCE UR: ABNORMAL
AST SERPL-CCNC: 16 U/L (ref 5–40)
BASOPHILS # BLD AUTO: 0.06 X10*3/UL (ref 0–0.1)
BASOPHILS NFR BLD AUTO: 0.7 %
BILIRUB SERPL-MCNC: 0.3 MG/DL (ref 0.1–1.2)
BILIRUB UR STRIP.AUTO-MCNC: NEGATIVE MG/DL
BUN SERPL-MCNC: 16 MG/DL (ref 8–25)
CALCIUM SERPL-MCNC: 9 MG/DL (ref 8.5–10.4)
CHLORIDE SERPL-SCNC: 102 MMOL/L (ref 97–107)
CHOLEST SERPL-MCNC: 145 MG/DL (ref 133–200)
CHOLEST/HDLC SERPL: 3.9 {RATIO}
CO2 SERPL-SCNC: 25 MMOL/L (ref 24–31)
COLOR UR: YELLOW
CREAT SERPL-MCNC: 1.3 MG/DL (ref 0.4–1.6)
CREAT UR-MCNC: 236.4 MG/DL
EGFRCR SERPLBLD CKD-EPI 2021: 52 ML/MIN/1.73M*2
EOSINOPHIL # BLD AUTO: 0.21 X10*3/UL (ref 0–0.4)
EOSINOPHIL NFR BLD AUTO: 2.3 %
ERYTHROCYTE [DISTWIDTH] IN BLOOD BY AUTOMATED COUNT: 13.3 % (ref 11.5–14.5)
GLUCOSE SERPL-MCNC: 141 MG/DL (ref 65–99)
GLUCOSE UR STRIP.AUTO-MCNC: NORMAL MG/DL
HCT VFR BLD AUTO: 40.4 % (ref 41–52)
HDLC SERPL-MCNC: 37 MG/DL
HGB BLD-MCNC: 12.7 G/DL (ref 13.5–17.5)
HOLD SPECIMEN: NORMAL
IMM GRANULOCYTES # BLD AUTO: 0.02 X10*3/UL (ref 0–0.5)
IMM GRANULOCYTES NFR BLD AUTO: 0.2 % (ref 0–0.9)
KETONES UR STRIP.AUTO-MCNC: NEGATIVE MG/DL
LDLC SERPL CALC-MCNC: 82 MG/DL (ref 65–130)
LEUKOCYTE ESTERASE UR QL STRIP.AUTO: ABNORMAL
LYMPHOCYTES # BLD AUTO: 3.48 X10*3/UL (ref 0.8–3)
LYMPHOCYTES NFR BLD AUTO: 38.7 %
MCH RBC QN AUTO: 27.5 PG (ref 26–34)
MCHC RBC AUTO-ENTMCNC: 31.4 G/DL (ref 32–36)
MCV RBC AUTO: 87 FL (ref 80–100)
MICROALBUMIN UR-MCNC: 182 MG/L (ref 0–23)
MICROALBUMIN/CREAT UR: 77 UG/MG CREAT
MONOCYTES # BLD AUTO: 0.59 X10*3/UL (ref 0.05–0.8)
MONOCYTES NFR BLD AUTO: 6.6 %
MUCOUS THREADS #/AREA URNS AUTO: NORMAL /LPF
NEUTROPHILS # BLD AUTO: 4.63 X10*3/UL (ref 1.6–5.5)
NEUTROPHILS NFR BLD AUTO: 51.5 %
NITRITE UR QL STRIP.AUTO: NEGATIVE
NRBC BLD-RTO: 0 /100 WBCS (ref 0–0)
PH UR STRIP.AUTO: 5 [PH]
PLATELET # BLD AUTO: 204 X10*3/UL (ref 150–450)
POTASSIUM SERPL-SCNC: 4.5 MMOL/L (ref 3.4–5.1)
PROT SERPL-MCNC: 7.7 G/DL (ref 5.9–7.9)
PROT UR STRIP.AUTO-MCNC: ABNORMAL MG/DL
RBC # BLD AUTO: 4.62 X10*6/UL (ref 4.5–5.9)
RBC # UR STRIP.AUTO: NEGATIVE /UL
RBC #/AREA URNS AUTO: NORMAL /HPF
SODIUM SERPL-SCNC: 138 MMOL/L (ref 133–145)
SP GR UR STRIP.AUTO: 1.02
SQUAMOUS #/AREA URNS AUTO: NORMAL /HPF
TRIGL SERPL-MCNC: 128 MG/DL (ref 40–150)
TSH SERPL DL<=0.05 MIU/L-ACNC: 3.05 MIU/L (ref 0.27–4.2)
UROBILINOGEN UR STRIP.AUTO-MCNC: NORMAL MG/DL
WBC # BLD AUTO: 9 X10*3/UL (ref 4.4–11.3)
WBC #/AREA URNS AUTO: NORMAL /HPF

## 2024-03-04 PROCEDURE — 80053 COMPREHEN METABOLIC PANEL: CPT

## 2024-03-04 PROCEDURE — 80061 LIPID PANEL: CPT

## 2024-03-04 PROCEDURE — 82043 UR ALBUMIN QUANTITATIVE: CPT

## 2024-03-04 PROCEDURE — 87086 URINE CULTURE/COLONY COUNT: CPT

## 2024-03-04 PROCEDURE — 85025 COMPLETE CBC W/AUTO DIFF WBC: CPT

## 2024-03-04 PROCEDURE — 81001 URINALYSIS AUTO W/SCOPE: CPT

## 2024-03-04 PROCEDURE — 84443 ASSAY THYROID STIM HORMONE: CPT

## 2024-03-04 PROCEDURE — 82570 ASSAY OF URINE CREATININE: CPT

## 2024-03-04 PROCEDURE — 36415 COLL VENOUS BLD VENIPUNCTURE: CPT

## 2024-03-06 LAB — BACTERIA UR CULT: NO GROWTH

## 2024-03-06 NOTE — PROGRESS NOTES
HPI   89 yo with Diabetes 2 (dx '95), HTN, Dyslipidemia, hashimoto's dz presents for 6 monthy follow up. Last A1c-7.8%, this month 8%.     Pt is testing sugars 4 times per day with galina 2. Pt is having low sugars 0-1 times/week.  Am sugars 100-120 range/upper 200's after breakfast, mid 100's at lunch, 200 prior to dinner, mid 100's at bedtime.  Pt is trying to follow a carb controlled diet and still doesn't know reasonable carb allowances. Pt is able to afford certain medications. Pt is exercising with walking regularly.           Lantus 24 units, metformin, prandin (2mg tid), ozempic 1mg weekly. Pt had too many lows on glipizide. Had pancreatitis hx in 2020 (although it appears it was prior to having gallbladder out).  Had [facility] jardiance in 2021, [facility] and [facility] states too expensive to restart in 2022.           Basal bolus would be ideal in this situation, pt does not want to do that.    -pt forgot his galina reader today           Pt off lisinopril 20mg after having a fall recently. Question of low blood pressure causing. Feeling better off it.           Pt taking lovastatin 20mg for lipids and tolerating.           Taking levothyroxine 88mcg qd, euthyroid, no obstructive goiter symptoms.               Current Outpatient Medications:     aspirin 81 mg chewable tablet, Chew 1 tablet (81 mg) once daily., Disp: , Rfl:     cholecalciferol (Vitamin D3) 50 mcg (2,000 unit) capsule, Take 1 capsule (50 mcg) by mouth early in the morning.., Disp: , Rfl:     FreeStyle Lancets 28 gauge, 1 each 2 times a day., Disp: , Rfl:     insulin glargine (Lantus Solostar U-100 Insulin) 100 unit/mL (3 mL) pen, Inject 19 Units under the skin once daily at bedtime. As directed, Disp: , Rfl:     levothyroxine (Synthroid, Levoxyl) 88 mcg tablet, TAKE 1 TABLET BY MOUTH ONCE DAILY IN THE MORNING ON AN EMPTY STOMACH, Disp: 90 tablet, Rfl: 1    lovastatin (Mevacor) 20 mg tablet, Take 1 tablet (20 mg) by mouth once daily at  "bedtime., Disp: , Rfl:     metFORMIN (Glucophage) 500 mg tablet, Take 2 tablets (1,000 mg) by mouth 2 times a day., Disp: , Rfl:     multivitamin capsule, Multivitamin, Disp: , Rfl:     omeprazole (PriLOSEC) 40 mg DR capsule, TAKE 1 CAPSULE BY MOUTH ONCE DAILY AS NEEDED FOR 90 DAYS, Disp: 90 capsule, Rfl: 1    pen needle, diabetic (BD Ultra-Fine Short Pen Needle) 31 gauge x 5/16\" needle, once daily., Disp: , Rfl:     polyethylene glycol (Glycolax, Miralax) 17 gram/dose powder, Take by mouth. As directed, Disp: , Rfl:     repaglinide (Prandin) 2 mg tablet, TAKE 1 TABLET BY MOUTH THREE TIMES DAILY, Disp: 270 tablet, Rfl: 1    semaglutide (Ozempic) 1 mg/dose (2 mg/1.5 mL) pen injector, Inject 1 mg under the skin 1 (one) time per week., Disp: , Rfl:     True Metrix Glucose Test Strip strip, 1 strip 2 times a day., Disp: , Rfl:       Allergies as of 03/08/2024 - Reviewed 03/08/2024   Allergen Reaction Noted    Penicillins Diarrhea and Unknown 09/13/2023    Penicillin Diarrhea 09/13/2023         Review of Systems   Cardiology: Lightheadedness-denies.  Chest pain-denies.  Leg edema-denies.  Palpitations-denies.  Respiratory: Cough-denies. Shortness of breath-denies.  Wheezing-denies.  Gastroenterology: Constipation-denies.  Diarrhea-denies.  Heartburn-denies.  Endocrinology: Cold intolerance-denies.  Heat intolerance-denies.  Sweats-denies.  Neurology: Headache-denies.  Tremor-denies.  Neuropathy in extremities-denies.  Psychology: Low energy-denies.  Irritability-denies.  Sleep disturbances-denies.      /61 (BP Location: Right arm, Patient Position: Sitting)   Pulse 67   Wt 76.8 kg (169 lb 6.4 oz)   BMI 24.31 kg/m²       Labs:  Lab Results   Component Value Date    WBC 9.0 03/04/2024    NRBC 0.0 03/04/2024    RBC 4.62 03/04/2024    HGB 12.7 (L) 03/04/2024    HCT 40.4 (L) 03/04/2024     03/04/2024     Lab Results   Component Value Date    CALCIUM 9.0 03/04/2024    AST 16 03/04/2024    ALKPHOS 79 03/04/2024 "    BILITOT 0.3 03/04/2024    PROT 7.7 03/04/2024    ALBUMIN 4.1 03/04/2024    GLOB 3.5 08/30/2023    AGR 1.1 (L) 08/30/2023     03/04/2024    K 4.5 03/04/2024     03/04/2024    CO2 25 03/04/2024    ANIONGAP 11 03/04/2024    BUN 16 03/04/2024    CREATININE 1.30 03/04/2024    UREACREAUR 11.7 08/30/2023    GLUCOSE 141 (H) 03/04/2024    ALT 16 03/04/2024    EGFR 52 (L) 03/04/2024     Lab Results   Component Value Date    CHOL 145 03/04/2024    TRIG 128 03/04/2024    HDL 37.0 (L) 03/04/2024    LDLCALC 82 03/04/2024     Lab Results   Component Value Date    MICROALBCREA 77.0 (H) 03/04/2024     Lab Results   Component Value Date    TSH 3.05 03/04/2024     Lab Results   Component Value Date    OIDZSNFO84 292 07/19/2022     Lab Results   Component Value Date    HGBA1C 8.0 (A) 03/01/2024         Physical Exam   General Appearance: pleasant, cooperative, no acute distress  HEENT: no chemosis, no proptosis, no lid lag, no lid retraction  Neck: no lymphadenopathy, no thyromegaly, no dominant thyroid nodules  Heart: no murmurs, regular rate and rhythm, S1 and S2  Lungs: no wheezes, no rhonci, no rales  Extremities: no lower extremity swelling      Assessment/Plan   1. Type 2 diabetes mellitus with chronic kidney disease, with long-term current use of insulin, unspecified CKD stage (CMS/[facility])  -A1c reviewed  -glycemic values reviewed, pt forgot galina reader  -labs reviewed    -really needs basal/bolus therapy, pt does not want prandial insulin  -increase prandin 2mg, increase from 1 to 2 pills at meals    -take b12 1mg otc daily    2. Primary hypertension  -currently at target, off therapy, no longer feeling dizzy    3. Mixed hyperlipidemia  -on statin, labs reviewed         Follow Up:  [place] 6 months    Medical Decision Making  Complexity of problem: Chronic illness of diabetes mellitus uncontrolled, progressing  Data analyzed and reviewed: Reviewed prior notes, blood glucose data, labs including HgbA1c, lipids, serum  chemistries.  Ordered tests.   Risk of complications and morbidities: Is definite because of use of insulin and risk of hypoglycemia.  Prescription medications reviewed and modifications made.  Compliance assessed.  Addressed social determinants of health including food insecurity.

## 2024-03-08 ENCOUNTER — OFFICE VISIT (OUTPATIENT)
Dept: ENDOCRINOLOGY | Facility: CLINIC | Age: 89
End: 2024-03-08
Payer: MEDICARE

## 2024-03-08 VITALS
DIASTOLIC BLOOD PRESSURE: 61 MMHG | WEIGHT: 169.4 LBS | SYSTOLIC BLOOD PRESSURE: 117 MMHG | BODY MASS INDEX: 24.31 KG/M2 | HEART RATE: 67 BPM

## 2024-03-08 DIAGNOSIS — I10 PRIMARY HYPERTENSION: Chronic | ICD-10-CM

## 2024-03-08 DIAGNOSIS — Z79.4 TYPE 2 DIABETES MELLITUS WITH CHRONIC KIDNEY DISEASE, WITH LONG-TERM CURRENT USE OF INSULIN, UNSPECIFIED CKD STAGE (MULTI): Primary | ICD-10-CM

## 2024-03-08 DIAGNOSIS — E11.22 TYPE 2 DIABETES MELLITUS WITH CHRONIC KIDNEY DISEASE, WITH LONG-TERM CURRENT USE OF INSULIN, UNSPECIFIED CKD STAGE (MULTI): Primary | ICD-10-CM

## 2024-03-08 DIAGNOSIS — E11.9 TYPE 2 DIABETES MELLITUS WITHOUT COMPLICATION, WITHOUT LONG-TERM CURRENT USE OF INSULIN (MULTI): ICD-10-CM

## 2024-03-08 DIAGNOSIS — E78.2 MIXED HYPERLIPIDEMIA: Chronic | ICD-10-CM

## 2024-03-08 PROCEDURE — 1159F MED LIST DOCD IN RCRD: CPT | Performed by: INTERNAL MEDICINE

## 2024-03-08 PROCEDURE — 1126F AMNT PAIN NOTED NONE PRSNT: CPT | Performed by: INTERNAL MEDICINE

## 2024-03-08 PROCEDURE — 3074F SYST BP LT 130 MM HG: CPT | Performed by: INTERNAL MEDICINE

## 2024-03-08 PROCEDURE — 1160F RVW MEDS BY RX/DR IN RCRD: CPT | Performed by: INTERNAL MEDICINE

## 2024-03-08 PROCEDURE — 3078F DIAST BP <80 MM HG: CPT | Performed by: INTERNAL MEDICINE

## 2024-03-08 PROCEDURE — 1157F ADVNC CARE PLAN IN RCRD: CPT | Performed by: INTERNAL MEDICINE

## 2024-03-08 PROCEDURE — 1036F TOBACCO NON-USER: CPT | Performed by: INTERNAL MEDICINE

## 2024-03-08 PROCEDURE — 99214 OFFICE O/P EST MOD 30 MIN: CPT | Performed by: INTERNAL MEDICINE

## 2024-03-08 RX ORDER — REPAGLINIDE 2 MG/1
TABLET ORAL
Qty: 540 TABLET | Refills: 1 | Status: SHIPPED | OUTPATIENT
Start: 2024-03-08

## 2024-03-08 ASSESSMENT — PAIN SCALES - GENERAL: PAINLEVEL: 0-NO PAIN

## 2024-03-08 ASSESSMENT — ENCOUNTER SYMPTOMS: DEPRESSION: 0

## 2024-03-09 DIAGNOSIS — E11.22 TYPE 2 DIABETES MELLITUS WITH CHRONIC KIDNEY DISEASE, WITH LONG-TERM CURRENT USE OF INSULIN, UNSPECIFIED CKD STAGE (MULTI): Primary | ICD-10-CM

## 2024-03-09 DIAGNOSIS — Z79.4 TYPE 2 DIABETES MELLITUS WITH CHRONIC KIDNEY DISEASE, WITH LONG-TERM CURRENT USE OF INSULIN, UNSPECIFIED CKD STAGE (MULTI): Primary | ICD-10-CM

## 2024-03-10 RX ORDER — LOVASTATIN 40 MG/1
40 TABLET ORAL
Qty: 90 TABLET | Refills: 0 | Status: SHIPPED | OUTPATIENT
Start: 2024-03-10 | End: 2024-06-04

## 2024-03-20 ENCOUNTER — TELEPHONE (OUTPATIENT)
Dept: PRIMARY CARE | Facility: CLINIC | Age: 89
End: 2024-03-20
Payer: MEDICARE

## 2024-03-20 DIAGNOSIS — N13.8 BENIGN PROSTATIC HYPERPLASIA WITH URINARY OBSTRUCTION: Primary | Chronic | ICD-10-CM

## 2024-03-20 DIAGNOSIS — N40.1 BENIGN PROSTATIC HYPERPLASIA WITH URINARY OBSTRUCTION: Primary | Chronic | ICD-10-CM

## 2024-03-21 RX ORDER — TAMSULOSIN HYDROCHLORIDE 0.4 MG/1
CAPSULE ORAL
Qty: 30 CAPSULE | Refills: 5 | Status: SHIPPED | OUTPATIENT
Start: 2024-03-21

## 2024-03-21 NOTE — TELEPHONE ENCOUNTER
Refill:  metFORMIN (Glucophage) 500 mg tablet Take 2 tablets (1,000 mg) by mouth 2 times a day.     Didn't transfer from [facility] yet:  Tamsulosin HCl 0.4 MG CapsuleTaking    Pharm:  [facility] [place]

## 2024-04-01 DIAGNOSIS — E11.22 TYPE 2 DIABETES MELLITUS WITH CHRONIC KIDNEY DISEASE, WITH LONG-TERM CURRENT USE OF INSULIN, UNSPECIFIED CKD STAGE (MULTI): Primary | ICD-10-CM

## 2024-04-01 DIAGNOSIS — Z79.4 TYPE 2 DIABETES MELLITUS WITH CHRONIC KIDNEY DISEASE, WITH LONG-TERM CURRENT USE OF INSULIN, UNSPECIFIED CKD STAGE (MULTI): Primary | ICD-10-CM

## 2024-04-01 RX ORDER — METFORMIN HYDROCHLORIDE 500 MG/1
1000 TABLET ORAL 2 TIMES DAILY
Qty: 360 TABLET | Refills: 3 | Status: SHIPPED | OUTPATIENT
Start: 2024-04-01 | End: 2024-04-02 | Stop reason: SDUPTHER

## 2024-04-01 NOTE — TELEPHONE ENCOUNTER
Requested Prescriptions     Pending Prescriptions Disp Refills    metFORMIN (Glucophage) 500 mg tablet 360 tablet 0     Sig: Take 2 tablets (1,000 mg) by mouth 2 times a day.     l

## 2024-04-01 NOTE — TELEPHONE ENCOUNTER
metFORMIN (Glucophage) 500 mg tablet Take 2 tablets (1,000 mg) by mouth 2 times a day.      [facility], [place]

## 2024-04-02 ENCOUNTER — LAB (OUTPATIENT)
Dept: LAB | Facility: LAB | Age: 89
End: 2024-04-02
Payer: MEDICARE

## 2024-04-02 ENCOUNTER — OFFICE VISIT (OUTPATIENT)
Dept: PRIMARY CARE | Facility: CLINIC | Age: 89
End: 2024-04-02
Payer: MEDICARE

## 2024-04-02 VITALS
BODY MASS INDEX: 24.37 KG/M2 | DIASTOLIC BLOOD PRESSURE: 72 MMHG | TEMPERATURE: 99.4 F | WEIGHT: 170.2 LBS | OXYGEN SATURATION: 97 % | HEART RATE: 88 BPM | HEIGHT: 70 IN | SYSTOLIC BLOOD PRESSURE: 128 MMHG

## 2024-04-02 DIAGNOSIS — Z79.4 TYPE 2 DIABETES MELLITUS WITH CHRONIC KIDNEY DISEASE, WITH LONG-TERM CURRENT USE OF INSULIN, UNSPECIFIED CKD STAGE (MULTI): Chronic | ICD-10-CM

## 2024-04-02 DIAGNOSIS — L56.8 PHOTODERMATITIS: ICD-10-CM

## 2024-04-02 DIAGNOSIS — L56.8 PHOTODERMATITIS: Primary | ICD-10-CM

## 2024-04-02 DIAGNOSIS — E11.22 TYPE 2 DIABETES MELLITUS WITH CHRONIC KIDNEY DISEASE, WITH LONG-TERM CURRENT USE OF INSULIN, UNSPECIFIED CKD STAGE (MULTI): Chronic | ICD-10-CM

## 2024-04-02 LAB
CRP SERPL-MCNC: <0.3 MG/DL (ref 0–2)
ERYTHROCYTE [DISTWIDTH] IN BLOOD BY AUTOMATED COUNT: 13.6 % (ref 11.5–14.5)
ERYTHROCYTE [SEDIMENTATION RATE] IN BLOOD BY WESTERGREN METHOD: 10 MM/H (ref 0–20)
HCT VFR BLD AUTO: 40.4 % (ref 41–52)
HGB BLD-MCNC: 13 G/DL (ref 13.5–17.5)
MCH RBC QN AUTO: 28.5 PG (ref 26–34)
MCHC RBC AUTO-ENTMCNC: 32.2 G/DL (ref 32–36)
MCV RBC AUTO: 89 FL (ref 80–100)
NRBC BLD-RTO: 0 /100 WBCS (ref 0–0)
PLATELET # BLD AUTO: 199 X10*3/UL (ref 150–450)
RBC # BLD AUTO: 4.56 X10*6/UL (ref 4.5–5.9)
WBC # BLD AUTO: 9.6 X10*3/UL (ref 4.4–11.3)

## 2024-04-02 PROCEDURE — 86140 C-REACTIVE PROTEIN: CPT

## 2024-04-02 PROCEDURE — 99213 OFFICE O/P EST LOW 20 MIN: CPT | Performed by: FAMILY MEDICINE

## 2024-04-02 PROCEDURE — 1159F MED LIST DOCD IN RCRD: CPT | Performed by: FAMILY MEDICINE

## 2024-04-02 PROCEDURE — 1160F RVW MEDS BY RX/DR IN RCRD: CPT | Performed by: FAMILY MEDICINE

## 2024-04-02 PROCEDURE — 1036F TOBACCO NON-USER: CPT | Performed by: FAMILY MEDICINE

## 2024-04-02 PROCEDURE — 3074F SYST BP LT 130 MM HG: CPT | Performed by: FAMILY MEDICINE

## 2024-04-02 PROCEDURE — 1126F AMNT PAIN NOTED NONE PRSNT: CPT | Performed by: FAMILY MEDICINE

## 2024-04-02 PROCEDURE — 1157F ADVNC CARE PLAN IN RCRD: CPT | Performed by: FAMILY MEDICINE

## 2024-04-02 PROCEDURE — 36415 COLL VENOUS BLD VENIPUNCTURE: CPT

## 2024-04-02 PROCEDURE — 85652 RBC SED RATE AUTOMATED: CPT

## 2024-04-02 PROCEDURE — 3078F DIAST BP <80 MM HG: CPT | Performed by: FAMILY MEDICINE

## 2024-04-02 PROCEDURE — 85027 COMPLETE CBC AUTOMATED: CPT

## 2024-04-02 RX ORDER — PREDNISONE 20 MG/1
20 TABLET ORAL 2 TIMES DAILY
Qty: 10 TABLET | Refills: 0 | Status: SHIPPED | OUTPATIENT
Start: 2024-04-02 | End: 2024-04-07

## 2024-04-02 RX ORDER — METFORMIN HYDROCHLORIDE 500 MG/1
1000 TABLET ORAL 2 TIMES DAILY
Qty: 360 TABLET | Refills: 3 | Status: SHIPPED | OUTPATIENT
Start: 2024-04-02

## 2024-04-02 ASSESSMENT — ENCOUNTER SYMPTOMS
SHORTNESS OF BREATH: 0
RHINORRHEA: 0
NAIL CHANGES: 0
DEPRESSION: 0
LOSS OF SENSATION IN FEET: 0
OCCASIONAL FEELINGS OF UNSTEADINESS: 0
COUGH: 0
DIARRHEA: 0
FEVER: 0
SORE THROAT: 0

## 2024-04-02 ASSESSMENT — PAIN SCALES - GENERAL: PAINLEVEL: 0-NO PAIN

## 2024-04-02 ASSESSMENT — PATIENT HEALTH QUESTIONNAIRE - PHQ9
2. FEELING DOWN, DEPRESSED OR HOPELESS: NOT AT ALL
SUM OF ALL RESPONSES TO PHQ9 QUESTIONS 1 AND 2: 0
1. LITTLE INTEREST OR PLEASURE IN DOING THINGS: NOT AT ALL

## 2024-04-02 NOTE — PROGRESS NOTES
"Subjective   Patient ID: Price Perez is a 89 y.o. male who presents for Rash.    Rash  This is a new problem. The current episode started 1 to 4 weeks ago. The rash is diffuse. The rash is characterized by burning and itchiness. He was exposed to nothing. Pertinent negatives include no congestion, cough, diarrhea, facial edema, fever, nail changes, rhinorrhea, shortness of breath or sore throat. Past treatments include topical steroids. The treatment provided mild relief. There is no history of allergies.      Rash on legs wendi  Review of Systems   Constitutional:  Negative for fever.   HENT:  Negative for congestion, rhinorrhea and sore throat.    Respiratory:  Negative for cough and shortness of breath.    Gastrointestinal:  Negative for diarrhea.   Skin:  Positive for rash. Negative for nail changes.     Small pin point rash to wendi le, denies blood in urine /stool, rash seem to worsen when he was out in the sun  Objective   /72   Pulse 88   Temp 37.4 °C (99.4 °F) (Temporal)   Ht 1.778 m (5' 10\")   Wt 77.2 kg (170 lb 3.2 oz)   SpO2 97%   BMI 24.42 kg/m²     Physical Exam  Vitals reviewed.   Constitutional:       Appearance: Normal appearance.   Cardiovascular:      Rate and Rhythm: Normal rate and regular rhythm.      Heart sounds: Normal heart sounds. No murmur heard.  Pulmonary:      Breath sounds: Normal breath sounds.   Abdominal:      General: Abdomen is flat. Bowel sounds are normal.      Palpations: Abdomen is soft.   Musculoskeletal:         General: No swelling.   Neurological:      Mental Status: He is alert.       Erythematous rash bilateral lower extremities and forearm regions without any pustule/vesicles  Small little petechiae noted  Assessment/Plan   Diagnoses and all orders for this visit:  Photodermatitis  -     Sedimentation Rate; Future  -     CBC; Future  -     C-Reactive Protein; Future  -     predniSONE (Deltasone) 20 mg tablet; Take 1 tablet (20 mg) by mouth 2 times a day for " 5 days.  -     metFORMIN (Glucophage) 500 mg tablet; Take 2 tablets (1,000 mg) by mouth 2 times a day.  Type 2 diabetes mellitus with chronic kidney disease, with long-term current use of insulin, unspecified CKD stage (CMS/[facility])  -     metFORMIN (Glucophage) 500 mg tablet; Take 2 tablets (1,000 mg) by mouth 2 times a day.    Cont otc moisturiezers, stop topical steroids

## 2024-05-17 DIAGNOSIS — E03.9 ACQUIRED HYPOTHYROIDISM: Chronic | ICD-10-CM

## 2024-05-17 RX ORDER — LEVOTHYROXINE SODIUM 88 UG/1
88 TABLET ORAL
Qty: 90 TABLET | Refills: 3 | Status: SHIPPED | OUTPATIENT
Start: 2024-05-17

## 2024-06-04 DIAGNOSIS — E11.22 TYPE 2 DIABETES MELLITUS WITH CHRONIC KIDNEY DISEASE, WITH LONG-TERM CURRENT USE OF INSULIN, UNSPECIFIED CKD STAGE (MULTI): ICD-10-CM

## 2024-06-04 DIAGNOSIS — Z79.4 TYPE 2 DIABETES MELLITUS WITH CHRONIC KIDNEY DISEASE, WITH LONG-TERM CURRENT USE OF INSULIN, UNSPECIFIED CKD STAGE (MULTI): ICD-10-CM

## 2024-06-04 RX ORDER — LOVASTATIN 40 MG/1
40 TABLET ORAL
Qty: 90 TABLET | Refills: 0 | Status: SHIPPED | OUTPATIENT
Start: 2024-06-04

## 2024-06-12 DIAGNOSIS — K21.9 GASTROESOPHAGEAL REFLUX DISEASE, UNSPECIFIED WHETHER ESOPHAGITIS PRESENT: Chronic | ICD-10-CM

## 2024-06-12 RX ORDER — OMEPRAZOLE 40 MG/1
CAPSULE, DELAYED RELEASE ORAL
Qty: 90 CAPSULE | Refills: 1 | Status: SHIPPED | OUTPATIENT
Start: 2024-06-12

## 2024-06-18 ENCOUNTER — TELEPHONE (OUTPATIENT)
Dept: PRIMARY CARE | Facility: CLINIC | Age: 89
End: 2024-06-18
Payer: MEDICARE

## 2024-06-18 NOTE — TELEPHONE ENCOUNTER
Price called as he has been having a cough that is keeping him up during the night. He mentioned that he has taken over the counter Nyquil, and Coricidin. He mentioned that he has drainage as well. He would like to know if you are able to call something into [facility].

## 2024-07-12 DIAGNOSIS — E11.9 TYPE 2 DIABETES MELLITUS WITHOUT COMPLICATION, WITHOUT LONG-TERM CURRENT USE OF INSULIN (MULTI): ICD-10-CM

## 2024-07-12 RX ORDER — REPAGLINIDE 2 MG/1
TABLET ORAL
Qty: 270 TABLET | Refills: 1 | Status: SHIPPED | OUTPATIENT
Start: 2024-07-12

## 2024-09-03 DIAGNOSIS — Z79.4 TYPE 2 DIABETES MELLITUS WITH CHRONIC KIDNEY DISEASE, WITH LONG-TERM CURRENT USE OF INSULIN, UNSPECIFIED CKD STAGE (MULTI): ICD-10-CM

## 2024-09-03 DIAGNOSIS — E11.22 TYPE 2 DIABETES MELLITUS WITH CHRONIC KIDNEY DISEASE, WITH LONG-TERM CURRENT USE OF INSULIN, UNSPECIFIED CKD STAGE (MULTI): ICD-10-CM

## 2024-09-03 RX ORDER — LOVASTATIN 40 MG/1
40 TABLET ORAL
Qty: 90 TABLET | Refills: 0 | Status: SHIPPED | OUTPATIENT
Start: 2024-09-03

## 2024-09-06 ENCOUNTER — APPOINTMENT (OUTPATIENT)
Dept: PRIMARY CARE | Facility: CLINIC | Age: 89
End: 2024-09-06
Payer: MEDICARE

## 2024-09-06 VITALS
OXYGEN SATURATION: 98 % | BODY MASS INDEX: 23.85 KG/M2 | HEIGHT: 71 IN | SYSTOLIC BLOOD PRESSURE: 122 MMHG | HEART RATE: 70 BPM | TEMPERATURE: 97.7 F | WEIGHT: 170.4 LBS | DIASTOLIC BLOOD PRESSURE: 62 MMHG

## 2024-09-06 DIAGNOSIS — E03.9 ACQUIRED HYPOTHYROIDISM: Chronic | ICD-10-CM

## 2024-09-06 DIAGNOSIS — E78.2 MIXED HYPERLIPIDEMIA: Chronic | ICD-10-CM

## 2024-09-06 DIAGNOSIS — I10 PRIMARY HYPERTENSION: Chronic | ICD-10-CM

## 2024-09-06 DIAGNOSIS — E11.9 TYPE 2 DIABETES MELLITUS WITHOUT COMPLICATION, WITHOUT LONG-TERM CURRENT USE OF INSULIN (MULTI): Primary | ICD-10-CM

## 2024-09-06 LAB — POC HEMOGLOBIN A1C: 7.8 % (ref 4.2–6.5)

## 2024-09-06 PROCEDURE — 3074F SYST BP LT 130 MM HG: CPT | Performed by: FAMILY MEDICINE

## 2024-09-06 PROCEDURE — 1160F RVW MEDS BY RX/DR IN RCRD: CPT | Performed by: FAMILY MEDICINE

## 2024-09-06 PROCEDURE — 3078F DIAST BP <80 MM HG: CPT | Performed by: FAMILY MEDICINE

## 2024-09-06 PROCEDURE — 83036 HEMOGLOBIN GLYCOSYLATED A1C: CPT | Mod: QW | Performed by: FAMILY MEDICINE

## 2024-09-06 PROCEDURE — 1157F ADVNC CARE PLAN IN RCRD: CPT | Performed by: FAMILY MEDICINE

## 2024-09-06 PROCEDURE — 99214 OFFICE O/P EST MOD 30 MIN: CPT | Performed by: FAMILY MEDICINE

## 2024-09-06 PROCEDURE — 1036F TOBACCO NON-USER: CPT | Performed by: FAMILY MEDICINE

## 2024-09-06 PROCEDURE — 1159F MED LIST DOCD IN RCRD: CPT | Performed by: FAMILY MEDICINE

## 2024-09-06 RX ORDER — REPAGLINIDE 2 MG/1
TABLET ORAL
Qty: 270 TABLET | Refills: 1 | Status: SHIPPED | OUTPATIENT
Start: 2024-09-06

## 2024-09-06 ASSESSMENT — ENCOUNTER SYMPTOMS
OCCASIONAL FEELINGS OF UNSTEADINESS: 1
ABDOMINAL DISTENTION: 0
SHORTNESS OF BREATH: 0
DEPRESSION: 0
COUGH: 0
LOSS OF SENSATION IN FEET: 0
PALPITATIONS: 0

## 2024-09-06 ASSESSMENT — LIFESTYLE VARIABLES
HOW OFTEN DO YOU HAVE A DRINK CONTAINING ALCOHOL: 2-4 TIMES A MONTH
HOW OFTEN DO YOU HAVE SIX OR MORE DRINKS ON ONE OCCASION: WEEKLY
SKIP TO QUESTIONS 9-10: 0
HOW MANY STANDARD DRINKS CONTAINING ALCOHOL DO YOU HAVE ON A TYPICAL DAY: 1 OR 2
AUDIT-C TOTAL SCORE: 5

## 2024-09-06 ASSESSMENT — COLUMBIA-SUICIDE SEVERITY RATING SCALE - C-SSRS
1. IN THE PAST MONTH, HAVE YOU WISHED YOU WERE DEAD OR WISHED YOU COULD GO TO SLEEP AND NOT WAKE UP?: NO
6. HAVE YOU EVER DONE ANYTHING, STARTED TO DO ANYTHING, OR PREPARED TO DO ANYTHING TO END YOUR LIFE?: NO
2. HAVE YOU ACTUALLY HAD ANY THOUGHTS OF KILLING YOURSELF?: NO

## 2024-09-06 ASSESSMENT — PATIENT HEALTH QUESTIONNAIRE - PHQ9
1. LITTLE INTEREST OR PLEASURE IN DOING THINGS: NOT AT ALL
2. FEELING DOWN, DEPRESSED OR HOPELESS: NOT AT ALL
SUM OF ALL RESPONSES TO PHQ9 QUESTIONS 1 AND 2: 0

## 2024-09-06 NOTE — PROGRESS NOTES
"Subjective   Patient ID: Price Perez is a 89 y.o. male who presents for Diabetes.    HPI Hypertension:          c/o Patient here for F/U. stable on meds.          Denies : Chest pain.   Hyperlipidemia:          c/o Patient here for F/U. doing well and without complaints, tolerating medicine well.   Hypothyroidism:          c/o Follow Up stable, taking hormone supplement routinely.   DM2:   -Chronic , stable  -A1c today: 7.8  - Frequency of blood glucose monitorin x day  -Hypoglycemic episodes: infrequent  -Concerns:  none  -Tolerating meds: yes  -[facility]  -Foot Exam: (recommend yearly) 3/1/24    Review of Systems   Respiratory:  Negative for cough and shortness of breath.    Cardiovascular:  Negative for chest pain and palpitations.   Gastrointestinal:  Negative for abdominal distention.       Objective   /62   Pulse 70   Temp 36.5 °C (97.7 °F)   Ht 1.791 m (5' 10.5\")   Wt 77.3 kg (170 lb 6.4 oz)   SpO2 98%   BMI 24.10 kg/m²     Physical Exam  Vitals reviewed.   Constitutional:       Appearance: Normal appearance.   Cardiovascular:      Rate and Rhythm: Normal rate and regular rhythm.      Heart sounds: Normal heart sounds. No murmur heard.  Pulmonary:      Breath sounds: Normal breath sounds.   Abdominal:      General: Abdomen is flat. Bowel sounds are normal.      Palpations: Abdomen is soft.   Musculoskeletal:         General: No swelling.   Neurological:      Mental Status: He is alert.         Assessment/Plan   Diagnoses and all orders for this visit:  Type 2 diabetes mellitus without complication, without long-term current use of insulin (Multi)  -     repaglinide (Prandin) 2 mg tablet; TAKE 1 TABLET BY MOUTH THREE TIMES DAILY  Primary hypertension  Acquired hypothyroidism  Mixed hyperlipidemia       Patient was identified as a fall risk. Risk prevention instructions provided.  "

## 2024-09-08 DIAGNOSIS — N40.1 BENIGN PROSTATIC HYPERPLASIA WITH URINARY OBSTRUCTION: Chronic | ICD-10-CM

## 2024-09-08 DIAGNOSIS — N13.8 BENIGN PROSTATIC HYPERPLASIA WITH URINARY OBSTRUCTION: Chronic | ICD-10-CM

## 2024-09-09 ENCOUNTER — LAB (OUTPATIENT)
Dept: LAB | Facility: LAB | Age: 89
End: 2024-09-09
Payer: MEDICARE

## 2024-09-09 DIAGNOSIS — E11.9 TYPE 2 DIABETES MELLITUS WITHOUT COMPLICATION, WITHOUT LONG-TERM CURRENT USE OF INSULIN (MULTI): ICD-10-CM

## 2024-09-09 LAB
ALBUMIN SERPL-MCNC: 3.8 G/DL (ref 3.5–5)
ALP BLD-CCNC: 90 U/L (ref 35–125)
ALT SERPL-CCNC: 16 U/L (ref 5–40)
ANION GAP SERPL CALC-SCNC: 11 MMOL/L
AST SERPL-CCNC: 16 U/L (ref 5–40)
BASOPHILS # BLD AUTO: 0.04 X10*3/UL (ref 0–0.1)
BASOPHILS NFR BLD AUTO: 0.5 %
BILIRUB SERPL-MCNC: 0.3 MG/DL (ref 0.1–1.2)
BUN SERPL-MCNC: 20 MG/DL (ref 8–25)
CALCIUM SERPL-MCNC: 9 MG/DL (ref 8.5–10.4)
CHLORIDE SERPL-SCNC: 101 MMOL/L (ref 97–107)
CO2 SERPL-SCNC: 24 MMOL/L (ref 24–31)
CREAT SERPL-MCNC: 1.2 MG/DL (ref 0.4–1.6)
EGFRCR SERPLBLD CKD-EPI 2021: 57 ML/MIN/1.73M*2
EOSINOPHIL # BLD AUTO: 0.17 X10*3/UL (ref 0–0.4)
EOSINOPHIL NFR BLD AUTO: 2.2 %
ERYTHROCYTE [DISTWIDTH] IN BLOOD BY AUTOMATED COUNT: 13.1 % (ref 11.5–14.5)
GLUCOSE SERPL-MCNC: 173 MG/DL (ref 65–99)
HCT VFR BLD AUTO: 39 % (ref 41–52)
HGB BLD-MCNC: 12.7 G/DL (ref 13.5–17.5)
IMM GRANULOCYTES # BLD AUTO: 0.02 X10*3/UL (ref 0–0.5)
IMM GRANULOCYTES NFR BLD AUTO: 0.3 % (ref 0–0.9)
LYMPHOCYTES # BLD AUTO: 3.06 X10*3/UL (ref 0.8–3)
LYMPHOCYTES NFR BLD AUTO: 38.8 %
MCH RBC QN AUTO: 28 PG (ref 26–34)
MCHC RBC AUTO-ENTMCNC: 32.6 G/DL (ref 32–36)
MCV RBC AUTO: 86 FL (ref 80–100)
MONOCYTES # BLD AUTO: 0.57 X10*3/UL (ref 0.05–0.8)
MONOCYTES NFR BLD AUTO: 7.2 %
NEUTROPHILS # BLD AUTO: 4.02 X10*3/UL (ref 1.6–5.5)
NEUTROPHILS NFR BLD AUTO: 51 %
NRBC BLD-RTO: 0 /100 WBCS (ref 0–0)
PLATELET # BLD AUTO: 200 X10*3/UL (ref 150–450)
POTASSIUM SERPL-SCNC: 4.6 MMOL/L (ref 3.4–5.1)
PROT SERPL-MCNC: 7.7 G/DL (ref 5.9–7.9)
RBC # BLD AUTO: 4.54 X10*6/UL (ref 4.5–5.9)
SODIUM SERPL-SCNC: 136 MMOL/L (ref 133–145)
WBC # BLD AUTO: 7.9 X10*3/UL (ref 4.4–11.3)

## 2024-09-09 PROCEDURE — 36415 COLL VENOUS BLD VENIPUNCTURE: CPT

## 2024-09-09 PROCEDURE — 85025 COMPLETE CBC W/AUTO DIFF WBC: CPT

## 2024-09-09 PROCEDURE — 80053 COMPREHEN METABOLIC PANEL: CPT

## 2024-09-09 RX ORDER — TAMSULOSIN HYDROCHLORIDE 0.4 MG/1
0.4 CAPSULE ORAL DAILY
Qty: 90 CAPSULE | Refills: 3 | Status: SHIPPED | OUTPATIENT
Start: 2024-09-09

## 2024-09-11 NOTE — PROGRESS NOTES
HPI   89 yo with Diabetes 2 (dx '95), HTN, Dyslipidemia, hashimoto's dz presents for 6 monthy follow up. Last A1c-8%, this month 7.8%.      Pt is testing sugars 4 times per day with galina 2. Pt is having low sugars 0-1 times/week.   Pt is trying to follow a carb controlled diet and still doesn't know reasonable carb allowances. Pt is able to afford certain medications. Pt is exercising with walking regularly.           Lantus 24 units, metformin, prandin (2mg) 2 pills breakfast/2 pills dinner, ozempic 1mg weekly. Pt had too many lows on glipizide.   Had pancreatitis hx in 2020 (although it appears it was prior to having gallbladder out).  Had started jardiance in 2021, [facility] and pt states too expensive to restart in 2022.           Basal bolus would be ideal in this situation, pt does not want to do that.     90 day galina 2 data: 45% in range, 0% low, pattern: upper 100's overnight, waking upper 100's/after breakfast 250 range, upper 100's/low 200's at dinner/mid 200's at bedtime.    -had suggested b12 1mg every day, still not taking    -taking levothyroxine 88mcg every day, euthyroid    -taking lovastatin 40 mg for statin and tolerating      Current Outpatient Medications:     aspirin 81 mg chewable tablet, Chew 1 tablet (81 mg) once daily., Disp: , Rfl:     cholecalciferol (Vitamin D3) 50 mcg (2,000 unit) capsule, Take 1 capsule (50 mcg) by mouth early in the morning.., Disp: , Rfl:     FreeStyle Lancets 28 gauge, 1 each 2 times a day., Disp: , Rfl:     insulin glargine (Lantus Solostar U-100 Insulin) 100 unit/mL (3 mL) pen, Inject 19 Units under the skin once daily at bedtime. As directed, Disp: , Rfl:     levothyroxine (Synthroid, Levoxyl) 88 mcg tablet, TAKE 1 TABLET BY MOUTH ONCE DAILY IN THE MORNING ON AN EMPTY STOMACH, Disp: 90 tablet, Rfl: 3    lovastatin (Mevacor) 40 mg tablet, TAKE 1 TABLET BY MOUTH ONCE DAILY WITH EVENING MEAL, Disp: 90 tablet, Rfl: 0    metFORMIN (Glucophage) 500 mg tablet,  "Take 2 tablets (1,000 mg) by mouth 2 times a day., Disp: 360 tablet, Rfl: 3    multivitamin capsule, Multivitamin, Disp: , Rfl:     omeprazole (PriLOSEC) 40 mg DR capsule, TAKE 1 CAPSULE BY MOUTH ONCE DAILY AS NEEDED FOR 90 DAYS, Disp: 90 capsule, Rfl: 1    pen needle, diabetic (BD Ultra-Fine Short Pen Needle) 31 gauge x 5/16\" needle, once daily., Disp: , Rfl:     polyethylene glycol (Glycolax, Miralax) 17 gram/dose powder, Take by mouth. As directed, Disp: , Rfl:     repaglinide (Prandin) 2 mg tablet, TAKE 1 TABLET BY MOUTH THREE TIMES DAILY, Disp: 270 tablet, Rfl: 1    semaglutide (Ozempic) 1 mg/dose (2 mg/1.5 mL) pen injector, Inject 1 mg under the skin 1 (one) time per week., Disp: , Rfl:     tamsulosin (Flomax) 0.4 mg 24 hr capsule, Take 1 capsule by mouth once daily, Disp: 90 capsule, Rfl: 3    True Metrix Glucose Test Strip strip, 1 strip 2 times a day., Disp: , Rfl:     vit A/vit C/vit E/zinc/copper (PRESERVISION AREDS ORAL), Take 2 tablets by mouth once daily., Disp: , Rfl:       Allergies as of 09/13/2024 - Reviewed 09/13/2024   Allergen Reaction Noted    Penicillins Diarrhea and Unknown 09/13/2023    Penicillin Diarrhea 09/13/2023         Review of Systems   Cardiology: Lightheadedness-denies.  Chest pain-denies.  Leg edema-denies.  Palpitations-denies.  Respiratory: Cough-denies. Shortness of breath-denies.  Wheezing-denies.  Gastroenterology: Constipation-denies.  Diarrhea-denies.  Heartburn-denies.  Endocrinology: Cold intolerance-denies.  Heat intolerance-denies.  Sweats-denies.  Neurology: Headache-denies.  Tremor-denies.  Neuropathy in extremities-denies.  Psychology: Low energy-denies.  Irritability-denies.  Sleep disturbances-denies.      /65 (BP Location: Right arm, Patient Position: Sitting, BP Cuff Size: Adult)   Pulse 67   Wt 76.9 kg (169 lb 9.6 oz)   BMI 23.99 kg/m²       Labs:  Lab Results   Component Value Date    WBC 7.9 09/09/2024    NRBC 0.0 09/09/2024    RBC 4.54 09/09/2024    " HGB 12.7 (L) 09/09/2024    HCT 39.0 (L) 09/09/2024     09/09/2024     Lab Results   Component Value Date    CALCIUM 9.0 09/09/2024    AST 16 09/09/2024    ALKPHOS 90 09/09/2024    BILITOT 0.3 09/09/2024    PROT 7.7 09/09/2024    ALBUMIN 3.8 09/09/2024    GLOB 3.5 08/30/2023    AGR 1.1 (L) 08/30/2023     09/09/2024    K 4.6 09/09/2024     09/09/2024    CO2 24 09/09/2024    ANIONGAP 11 09/09/2024    BUN 20 09/09/2024    CREATININE 1.20 09/09/2024    UREACREAUR 11.7 08/30/2023    GLUCOSE 173 (H) 09/09/2024    ALT 16 09/09/2024    EGFR 57 (L) 09/09/2024     Lab Results   Component Value Date    CHOL 145 03/04/2024    TRIG 128 03/04/2024    HDL 37.0 (L) 03/04/2024    LDLCALC 82 03/04/2024     Lab Results   Component Value Date    MICROALBCREA 77.0 (H) 03/04/2024     Lab Results   Component Value Date    TSH 3.05 03/04/2024     Lab Results   Component Value Date    RKRSQLOI42 292 07/19/2022     Lab Results   Component Value Date    HGBA1C 7.8 (A) 09/06/2024         Physical Exam   General Appearance: pleasant, cooperative, no acute distress  HEENT: no chemosis, no proptosis, no lid lag, no lid retraction  Neck: no lymphadenopathy, no thyromegaly, no dominant thyroid nodules  Heart: no murmurs, regular rate and rhythm, S1 and S2  Lungs: no wheezes, no rhonci, no rales  Extremities: no lower extremity swelling      Assessment/Plan   1. Type 2 diabetes mellitus with chronic kidney disease, with long-term current use of insulin, unspecified CKD stage (Multi)  -A1c ordered and reviewed  -galina data reviewed  -labs reviewed    -pt needs prandial insulin, likely 3 units at meals (pt does not want to take)    2. Primary hypertension  -at target on therapy    3. Mixed hyperlipidemia  -on statin and tolerating, labs reviewed         Follow Up:  L[place] 6 months    -labs/tests/notes reviewed  -reviewed and counseled patient on medication monitoring and side effects

## 2024-09-13 ENCOUNTER — APPOINTMENT (OUTPATIENT)
Dept: ENDOCRINOLOGY | Facility: CLINIC | Age: 89
End: 2024-09-13
Payer: MEDICARE

## 2024-09-13 VITALS
WEIGHT: 169.6 LBS | HEART RATE: 67 BPM | DIASTOLIC BLOOD PRESSURE: 65 MMHG | SYSTOLIC BLOOD PRESSURE: 133 MMHG | BODY MASS INDEX: 23.99 KG/M2

## 2024-09-13 DIAGNOSIS — E78.2 MIXED HYPERLIPIDEMIA: ICD-10-CM

## 2024-09-13 DIAGNOSIS — Z79.4 TYPE 2 DIABETES MELLITUS WITH CHRONIC KIDNEY DISEASE, WITH LONG-TERM CURRENT USE OF INSULIN, UNSPECIFIED CKD STAGE (MULTI): Primary | ICD-10-CM

## 2024-09-13 DIAGNOSIS — I10 PRIMARY HYPERTENSION: ICD-10-CM

## 2024-09-13 DIAGNOSIS — E11.22 TYPE 2 DIABETES MELLITUS WITH CHRONIC KIDNEY DISEASE, WITH LONG-TERM CURRENT USE OF INSULIN, UNSPECIFIED CKD STAGE (MULTI): Primary | ICD-10-CM

## 2024-09-13 PROCEDURE — 3078F DIAST BP <80 MM HG: CPT | Performed by: INTERNAL MEDICINE

## 2024-09-13 PROCEDURE — 99214 OFFICE O/P EST MOD 30 MIN: CPT | Performed by: INTERNAL MEDICINE

## 2024-09-13 PROCEDURE — 1157F ADVNC CARE PLAN IN RCRD: CPT | Performed by: INTERNAL MEDICINE

## 2024-09-13 PROCEDURE — 1036F TOBACCO NON-USER: CPT | Performed by: INTERNAL MEDICINE

## 2024-09-13 PROCEDURE — 1159F MED LIST DOCD IN RCRD: CPT | Performed by: INTERNAL MEDICINE

## 2024-09-13 PROCEDURE — 3075F SYST BP GE 130 - 139MM HG: CPT | Performed by: INTERNAL MEDICINE

## 2024-09-13 ASSESSMENT — ENCOUNTER SYMPTOMS: DEPRESSION: 0

## 2024-11-27 DIAGNOSIS — E11.22 TYPE 2 DIABETES MELLITUS WITH CHRONIC KIDNEY DISEASE, WITH LONG-TERM CURRENT USE OF INSULIN, UNSPECIFIED CKD STAGE (MULTI): ICD-10-CM

## 2024-11-27 DIAGNOSIS — Z79.4 TYPE 2 DIABETES MELLITUS WITH CHRONIC KIDNEY DISEASE, WITH LONG-TERM CURRENT USE OF INSULIN, UNSPECIFIED CKD STAGE (MULTI): ICD-10-CM

## 2024-11-27 RX ORDER — LOVASTATIN 40 MG/1
40 TABLET ORAL
Qty: 90 TABLET | Refills: 0 | Status: SHIPPED | OUTPATIENT
Start: 2024-11-27

## 2024-12-10 DIAGNOSIS — K21.9 GASTROESOPHAGEAL REFLUX DISEASE, UNSPECIFIED WHETHER ESOPHAGITIS PRESENT: Chronic | ICD-10-CM

## 2024-12-10 RX ORDER — OMEPRAZOLE 40 MG/1
40 CAPSULE, DELAYED RELEASE ORAL DAILY PRN
Qty: 90 CAPSULE | Refills: 1 | Status: SHIPPED | OUTPATIENT
Start: 2024-12-10

## 2024-12-12 ENCOUNTER — HOSPITAL ENCOUNTER (EMERGENCY)
Facility: HOSPITAL | Age: 89
Discharge: HOME | End: 2024-12-12
Payer: MEDICARE

## 2024-12-12 VITALS
HEART RATE: 78 BPM | TEMPERATURE: 97.9 F | RESPIRATION RATE: 15 BRPM | HEIGHT: 70 IN | DIASTOLIC BLOOD PRESSURE: 81 MMHG | BODY MASS INDEX: 24.34 KG/M2 | OXYGEN SATURATION: 97 % | SYSTOLIC BLOOD PRESSURE: 169 MMHG | WEIGHT: 170 LBS

## 2024-12-12 DIAGNOSIS — R04.0 EPISTAXIS: Primary | ICD-10-CM

## 2024-12-12 PROCEDURE — 30901 CONTROL OF NOSEBLEED: CPT

## 2024-12-12 PROCEDURE — 2500000002 HC RX 250 W HCPCS SELF ADMINISTERED DRUGS (ALT 637 FOR MEDICARE OP, ALT 636 FOR OP/ED)

## 2024-12-12 PROCEDURE — 2500000005 HC RX 250 GENERAL PHARMACY W/O HCPCS

## 2024-12-12 PROCEDURE — 99283 EMERGENCY DEPT VISIT LOW MDM: CPT

## 2024-12-12 PROCEDURE — 2500000001 HC RX 250 WO HCPCS SELF ADMINISTERED DRUGS (ALT 637 FOR MEDICARE OP)

## 2024-12-12 RX ORDER — OXYMETAZOLINE HCL 0.05 %
2 SPRAY, NON-AEROSOL (ML) NASAL ONCE
Status: COMPLETED | OUTPATIENT
Start: 2024-12-12 | End: 2024-12-12

## 2024-12-12 RX ORDER — SILVER NITRATE 38.21; 12.74 MG/1; MG/1
1 STICK TOPICAL ONCE
Status: COMPLETED | OUTPATIENT
Start: 2024-12-12 | End: 2024-12-12

## 2024-12-12 RX ORDER — TRANEXAMIC ACID 650 MG/1
650 TABLET ORAL ONCE
Status: COMPLETED | OUTPATIENT
Start: 2024-12-12 | End: 2024-12-12

## 2024-12-12 ASSESSMENT — PAIN - FUNCTIONAL ASSESSMENT: PAIN_FUNCTIONAL_ASSESSMENT: 0-10

## 2024-12-12 ASSESSMENT — COLUMBIA-SUICIDE SEVERITY RATING SCALE - C-SSRS
1. IN THE PAST MONTH, HAVE YOU WISHED YOU WERE DEAD OR WISHED YOU COULD GO TO SLEEP AND NOT WAKE UP?: NO
2. HAVE YOU ACTUALLY HAD ANY THOUGHTS OF KILLING YOURSELF?: NO
6. HAVE YOU EVER DONE ANYTHING, STARTED TO DO ANYTHING, OR PREPARED TO DO ANYTHING TO END YOUR LIFE?: NO

## 2024-12-12 ASSESSMENT — PAIN SCALES - GENERAL: PAINLEVEL_OUTOF10: 3

## 2024-12-12 NOTE — ED PROVIDER NOTES
HPI   Chief Complaint   Patient presents with    Epistaxis (Nose Bleed)     Pt was at breakfast at a restaurant and now he is bleeding from the left side of his nose.        Patient is a 89-year-old female who presents emergency department for evaluation of left-sided nasal bleed.  Patient states that about an hour prior to presentation he was eating at a restaurant when he is having bleeding of the left side of his nostril.  He states that he has a history of nosebleeds and had to follow with ENT before for cauterization on the right side of his nose.  He states that he is not currently on any blood thinners.  He denies any trauma or injury to the nose that he recalls.  He states he was unable to stop the bleeding and therefore presents here for further evaluation.  He denies any lightheadedness, dizziness, cough, congestion, nausea, vomiting, or other symptoms at this time.      History provided by:  Patient   used: No            Patient History   Past Medical History:   Diagnosis Date    DM type 2 (diabetes mellitus, type 2) (Multi)     Gallstone pancreatitis ([facility]-HCC) 09/13/2023    History of cholecystectomy 09/13/2023    Hyperlipidemia     Hypertension     Mixed hyperlipidemia     Personal history of other diseases of the circulatory system     History of hypertension    Primary hypertension     Type 2 diabetes mellitus with chronic kidney disease, with long-term current use of insulin, unspecified CKD stage (Multi)     Type 2 diabetes mellitus without complications (Multi) 11/06/2015    Diabetes mellitus    Urinary retention 09/13/2023     Past Surgical History:   Procedure Laterality Date    COLONOSCOPY  01/19/2017    DR SEYMOUR    [facility]    LITH[facility]IPSY  [facility]    OTHER SURGICAL HISTORY  06/10/2021    Cholecystectomy    PROSTATE SURGERY  11/2020     Family History   Problem Relation Name Age of Onset    Hypertension Mother      Diabetes type II Father      Colon  cancer Sister      Lymphoma Sister      ALS Brother      Heart disease Brother       Social History     Tobacco Use    Smoking status: Former     Types: Cigarettes     Passive exposure: Past    Smokeless tobacco: Never   Substance Use Topics    Alcohol use: Yes    Drug use: Not Currently       Physical Exam   ED Triage Vitals [12/12/24 1125]   Temperature Heart Rate Respirations BP   36.6 °C (97.9 °F) 78 15 169/81      Pulse Ox Temp Source Heart Rate Source Patient Position   97 % Temporal Monitor --      BP Location FiO2 (%)     -- --       Physical Exam  Constitutional:       Appearance: Normal appearance.   HENT:      Nose:      Comments: Right nostril clear with left nostril with blood clot noted and small anterior patch to left nasal septum with oozing of blood.     Mouth/Throat:      Mouth: Mucous membranes are moist.      Comments: No bleeding down posterior pharynx with no stridor, no pharyngeal erythema, or other lesions  Cardiovascular:      Rate and Rhythm: Normal rate and regular rhythm.   Pulmonary:      Effort: Pulmonary effort is normal.      Breath sounds: Normal breath sounds.   Musculoskeletal:         General: Normal range of motion.   Skin:     General: Skin is warm and dry.   Neurological:      General: No focal deficit present.      Mental Status: He is oriented to person, place, and time.           ED Course & MDM   Diagnoses as of 12/12/24 4824   Epistaxis                 No data recorded     [place] Coma Scale Score: 15 (12/12/24 1128 : Renu Yeh RN)                           Medical Decision Making  Patient is a 89-year-old male presents emergency department for evaluation of epistaxis.    Lab work and scans not warranted at today's visit.    Medications given at today's visit include nasal Afrin    I saw this patient independently.  On initial evaluation, Afrin given and nasal clamping done.  Patient had cessation of nasal bleeding and patient observed for a 20-minute period.   Patient began having some bleeding out of the left nostril once again and therefore silver nitrate stick was ordered and small area of anterior bleeding to left nostril was cauterized.  Patient tolerated this well.  He was observed for extended period of time and had no recurrence of nasal bleeding.  No indication for nasal packing at this time.  Bacitracin was applied to area and patient was educated on continued humidifiers, application of Vaseline or bacitracin to help with moisturization of nasal cavity and close follow-up with his ENT provider outpatient for recurrent nosebleeds.  He is otherwise stable to be discharged to follow-up closely outpatient.  Emergent pathologies were considered for this patient, although I have low suspicion for anything acutely emergent given patient's clinical presentation, history, physical exam, stable vital signs.  Discharging patient home is reasonable plan of care for outpatient management.    Patient was counseled on clinical impression, expectations, and plan.  Patient was educated to follow-up with PCP in the following 1-2 days.  All questions from patient were answered. They elicited understanding and were agreeable to course of treatment.  Patient was discharged in stable condition and given strict return precautions.    ** Disclaimer:  Parts of this document were written utilizing a voice to text dictation software.  Note may contain minor transcription or typographical errors that were inadvertently transcribed by the computer software.         Procedure  Epistaxis Management    Performed by: Kristy Szymanski PA-C  Authorized by: Kristy Szymanski PA-C    Consent:     Consent obtained:  Verbal    Consent given by:  Patient    Risks, benefits, and alternatives were discussed: yes      Risks discussed:  Bleeding, infection, nasal injury and pain    Alternatives discussed:  No treatment  Universal protocol:     Patient identity confirmed:  Verbally with patient  Anesthesia:      Anesthesia method:  None  Procedure details:     Treatment site:  L anterior and L septum    Treatment method:  Silver nitrate    Treatment complexity:  Limited    Treatment episode: initial    Post-procedure details:     Assessment:  Bleeding stopped    Procedure completion:  Tolerated well, no immediate complications       Kristy Szymanski PA-C  12/12/24 9118

## 2024-12-12 NOTE — DISCHARGE INSTRUCTIONS
Follow-up closely with ENT specialist in the following week.  Do not blow your nose or pick at nose or white nose or cause any trauma to the nose over the next 48 hours.  Continue applying Vaseline or bacitracin to nostrils to allow for moisturization.  Can also additionally use humidifier in the home to help keep nasal mucosa moist to prevent further bleeding.    Follow-up closely with your primary care provider in the following week.

## 2024-12-13 ENCOUNTER — OFFICE VISIT (OUTPATIENT)
Dept: PRIMARY CARE | Facility: CLINIC | Age: 89
End: 2024-12-13
Payer: MEDICARE

## 2024-12-13 VITALS
TEMPERATURE: 97.7 F | HEIGHT: 70 IN | OXYGEN SATURATION: 99 % | DIASTOLIC BLOOD PRESSURE: 60 MMHG | WEIGHT: 174.2 LBS | SYSTOLIC BLOOD PRESSURE: 130 MMHG | BODY MASS INDEX: 24.94 KG/M2 | HEART RATE: 75 BPM

## 2024-12-13 DIAGNOSIS — R04.0 EPISTAXIS: ICD-10-CM

## 2024-12-13 DIAGNOSIS — E03.9 ACQUIRED HYPOTHYROIDISM: Chronic | ICD-10-CM

## 2024-12-13 DIAGNOSIS — N13.8 BENIGN PROSTATIC HYPERPLASIA WITH URINARY OBSTRUCTION: Chronic | ICD-10-CM

## 2024-12-13 DIAGNOSIS — Z79.4 TYPE 2 DIABETES MELLITUS WITH STAGE 3A CHRONIC KIDNEY DISEASE, WITH LONG-TERM CURRENT USE OF INSULIN (MULTI): Primary | Chronic | ICD-10-CM

## 2024-12-13 DIAGNOSIS — I10 PRIMARY HYPERTENSION: Chronic | ICD-10-CM

## 2024-12-13 DIAGNOSIS — E78.2 MIXED HYPERLIPIDEMIA: Chronic | ICD-10-CM

## 2024-12-13 DIAGNOSIS — N40.1 BENIGN PROSTATIC HYPERPLASIA WITH URINARY OBSTRUCTION: Chronic | ICD-10-CM

## 2024-12-13 DIAGNOSIS — E11.22 TYPE 2 DIABETES MELLITUS WITH STAGE 3A CHRONIC KIDNEY DISEASE, WITH LONG-TERM CURRENT USE OF INSULIN (MULTI): Primary | Chronic | ICD-10-CM

## 2024-12-13 DIAGNOSIS — N18.31 TYPE 2 DIABETES MELLITUS WITH STAGE 3A CHRONIC KIDNEY DISEASE, WITH LONG-TERM CURRENT USE OF INSULIN (MULTI): Primary | Chronic | ICD-10-CM

## 2024-12-13 DIAGNOSIS — K21.9 GASTROESOPHAGEAL REFLUX DISEASE, UNSPECIFIED WHETHER ESOPHAGITIS PRESENT: Chronic | ICD-10-CM

## 2024-12-13 LAB — POC HEMOGLOBIN A1C: 8.1 % (ref 4.2–6.5)

## 2024-12-13 PROCEDURE — 1126F AMNT PAIN NOTED NONE PRSNT: CPT | Performed by: FAMILY MEDICINE

## 2024-12-13 PROCEDURE — 1157F ADVNC CARE PLAN IN RCRD: CPT | Performed by: FAMILY MEDICINE

## 2024-12-13 PROCEDURE — 99214 OFFICE O/P EST MOD 30 MIN: CPT | Mod: 25 | Performed by: FAMILY MEDICINE

## 2024-12-13 PROCEDURE — 99214 OFFICE O/P EST MOD 30 MIN: CPT | Performed by: FAMILY MEDICINE

## 2024-12-13 PROCEDURE — 1159F MED LIST DOCD IN RCRD: CPT | Performed by: FAMILY MEDICINE

## 2024-12-13 PROCEDURE — 83036 HEMOGLOBIN GLYCOSYLATED A1C: CPT | Performed by: FAMILY MEDICINE

## 2024-12-13 PROCEDURE — 1160F RVW MEDS BY RX/DR IN RCRD: CPT | Performed by: FAMILY MEDICINE

## 2024-12-13 PROCEDURE — 1036F TOBACCO NON-USER: CPT | Performed by: FAMILY MEDICINE

## 2024-12-13 PROCEDURE — 3075F SYST BP GE 130 - 139MM HG: CPT | Performed by: FAMILY MEDICINE

## 2024-12-13 PROCEDURE — 3078F DIAST BP <80 MM HG: CPT | Performed by: FAMILY MEDICINE

## 2024-12-13 ASSESSMENT — ENCOUNTER SYMPTOMS
OCCASIONAL FEELINGS OF UNSTEADINESS: 0
LOSS OF SENSATION IN FEET: 0
DEPRESSION: 0

## 2024-12-13 ASSESSMENT — COLUMBIA-SUICIDE SEVERITY RATING SCALE - C-SSRS
2. HAVE YOU ACTUALLY HAD ANY THOUGHTS OF KILLING YOURSELF?: NO
6. HAVE YOU EVER DONE ANYTHING, STARTED TO DO ANYTHING, OR PREPARED TO DO ANYTHING TO END YOUR LIFE?: NO
1. IN THE PAST MONTH, HAVE YOU WISHED YOU WERE DEAD OR WISHED YOU COULD GO TO SLEEP AND NOT WAKE UP?: NO

## 2024-12-13 ASSESSMENT — LIFESTYLE VARIABLES
HOW OFTEN DO YOU HAVE SIX OR MORE DRINKS ON ONE OCCASION: LESS THAN MONTHLY
AUDIT-C TOTAL SCORE: 2
HOW OFTEN DO YOU HAVE A DRINK CONTAINING ALCOHOL: MONTHLY OR LESS
HOW MANY STANDARD DRINKS CONTAINING ALCOHOL DO YOU HAVE ON A TYPICAL DAY: 1 OR 2
SKIP TO QUESTIONS 9-10: 0

## 2024-12-13 ASSESSMENT — PAIN SCALES - GENERAL: PAINLEVEL_OUTOF10: 0-NO PAIN

## 2024-12-13 NOTE — PROGRESS NOTES
"Subjective   Patient ID: Price Perez is a 89 y.o. male who presents for Diabetes (Was sent by ambulance to ER yesterday because the pts nose would not stop bleeding. ).    Diabetes       DM2:   -Chronic , stable  -A1c today:  8.1  - Frequency of blood glucose monitoring:  -Hypoglycemic episodes: infrequent  -Concerns:  none  -Tolerating meds: yes  Dyslipidemia  -Patient presents for follow up chronic ,stable problem  -Concerns: none   -Treatment:  lovastatin  Hypothyroidism:  -Chronic stable problem  -Tolerating medication, due for tsh  -Concerns: none  Bph : on tamsulosin, doing well, urine flow is stable  Gerd stable on ppi,    Also ED follow-up for epistaxis left nostril, seen at the emergency department yesterday and cauterized.  No further bleeding  Review of Systems    Objective   /60   Pulse 75   Temp 36.5 °C (97.7 °F)   Ht 1.778 m (5' 10\")   Wt 79 kg (174 lb 3.2 oz)   SpO2 99%   BMI 25.00 kg/m²     Physical Exam  Vitals reviewed.   Constitutional:       Appearance: Normal appearance.   Cardiovascular:      Rate and Rhythm: Normal rate and regular rhythm.      Heart sounds: Normal heart sounds. No murmur heard.  Pulmonary:      Breath sounds: Normal breath sounds.   Abdominal:      General: Abdomen is flat. Bowel sounds are normal.      Palpations: Abdomen is soft.   Musculoskeletal:         General: No swelling.   Neurological:      Mental Status: He is alert.     Left nostril appears normal without any active bleeding, cautery shahid was noted    Assessment/Plan   Diagnoses and all orders for this visit:  Type 2 diabetes mellitus with stage 3a chronic kidney disease, with long-term current use of insulin (Multi)  -     Follow Up In Primary Care - Established  -     POCT glycosylated hemoglobin (Hb A1C) manually resulted  Mixed hyperlipidemia  Acquired hypothyroidism  Primary hypertension  Gastroesophageal reflux disease, unspecified whether esophagitis present  Benign prostatic hyperplasia with " urinary obstruction    Cont meds  Rto 3mo

## 2024-12-20 ENCOUNTER — TELEPHONE (OUTPATIENT)
Dept: PRIMARY CARE | Facility: CLINIC | Age: 89
End: 2024-12-20
Payer: MEDICARE

## 2024-12-20 NOTE — TELEPHONE ENCOUNTER
He was at ER last week for epistaxis, was cauterized. He is having another episode now, has been dripping for about 30 minutes. Asking if they should return to ER or try to stop it themselves.  I spoke with nurse and she gave following advice to try and stop themselves - hold pressure, tilt forward and apply ice or cold to bridge of nose and if bleeding continues return to ER.

## 2024-12-24 ENCOUNTER — OFFICE VISIT (OUTPATIENT)
Dept: OTOLARYNGOLOGY | Facility: CLINIC | Age: 89
End: 2024-12-24
Payer: MEDICARE

## 2024-12-24 DIAGNOSIS — J34.2 DEVIATED NASAL SEPTUM: ICD-10-CM

## 2024-12-24 DIAGNOSIS — R04.0 EPISTAXIS: Primary | ICD-10-CM

## 2024-12-24 PROCEDURE — 1157F ADVNC CARE PLAN IN RCRD: CPT | Performed by: OTOLARYNGOLOGY

## 2024-12-24 PROCEDURE — 1160F RVW MEDS BY RX/DR IN RCRD: CPT | Performed by: OTOLARYNGOLOGY

## 2024-12-24 PROCEDURE — 1159F MED LIST DOCD IN RCRD: CPT | Performed by: OTOLARYNGOLOGY

## 2024-12-24 PROCEDURE — 99214 OFFICE O/P EST MOD 30 MIN: CPT | Performed by: OTOLARYNGOLOGY

## 2024-12-24 PROCEDURE — 30903 CONTROL OF NOSEBLEED: CPT | Performed by: OTOLARYNGOLOGY

## 2024-12-24 PROCEDURE — 1036F TOBACCO NON-USER: CPT | Performed by: OTOLARYNGOLOGY

## 2024-12-24 NOTE — PROGRESS NOTES
Chief Complaint   Patient presents with    Epistaxis (Nose Bleed)     LOV: 6/2022 NOSE BLEEDS, SEEN IN ER ON 12/12/2024 PREVIOUSLY CAUTERIZED      HPI:  Price Perez is a 89 y.o. male who has been having problems as past month with recurrent left-sided epistaxis.  Was at the ER about 2 weeks ago.  He did have some cauterization which slowed it down.  But still getting some a.m. bleeds which he controls with packing on his own.  Not on any blood thinners.      PMH:  Past Medical History:   Diagnosis Date    DM type 2 (diabetes mellitus, type 2) (Multi)     Gallstone pancreatitis ([facility]-HCC) 09/13/2023    History of cholecystectomy 09/13/2023    Hyperlipidemia     Hypertension     Mixed hyperlipidemia     Personal history of other diseases of the circulatory system     History of hypertension    Primary hypertension     Type 2 diabetes mellitus with chronic kidney disease, with long-term current use of insulin, unspecified CKD stage (Multi)     Type 2 diabetes mellitus without complications (Multi) 11/06/2015    Diabetes mellitus    Urinary retention 09/13/2023     Past Surgical History:   Procedure Laterality Date    COLONOSCOPY  01/19/2017    DR SEYMOUR    [facility]    [facility]  [facility]    OTHER SURGICAL HISTORY  06/10/2021    Cholecystectomy    PROSTATE SURGERY  11/2020         Medications:     Current Outpatient Medications:     aspirin 81 mg chewable tablet, Chew 1 tablet (81 mg) once daily., Disp: , Rfl:     cholecalciferol (Vitamin D3) 50 mcg (2,000 unit) capsule, Take 1 capsule (50 mcg) by mouth early in the morning.., Disp: , Rfl:     FreeStyle Lancets 28 gauge, 1 each 2 times a day., Disp: , Rfl:     insulin glargine (Lantus Solostar U-100 Insulin) 100 unit/mL (3 mL) pen, Inject 19 Units under the skin once daily at bedtime. As directed, Disp: , Rfl:     levothyroxine (Synthroid, Levoxyl) 88 mcg tablet, TAKE 1 TABLET BY MOUTH ONCE DAILY IN THE MORNING ON AN EMPTY STOMACH, Disp: 90  "tablet, Rfl: 3    lovastatin (Mevacor) 40 mg tablet, TAKE 1 TABLET BY MOUTH ONCE DAILY WITH EVENING MEAL, Disp: 90 tablet, Rfl: 0    magnesium, amino acid chelate, 133 mg tablet, Take 1 tablet (133 mg) by mouth 2 times a day., Disp: , Rfl:     metFORMIN (Glucophage) 500 mg tablet, Take 2 tablets (1,000 mg) by mouth 2 times a day., Disp: 360 tablet, Rfl: 3    multivitamin capsule, Multivitamin, Disp: , Rfl:     omeprazole (PriLOSEC) 40 mg DR capsule, TAKE 1 CAPSULE BY MOUTH ONCE DAILY AS NEEDED, Disp: 90 capsule, Rfl: 1    pen needle, diabetic (BD Ultra-Fine Short Pen Needle) 31 gauge x 5/16\" needle, once daily., Disp: , Rfl:     polyethylene glycol (Glycolax, Miralax) 17 gram/dose powder, Take by mouth. As directed, Disp: , Rfl:     repaglinide (Prandin) 2 mg tablet, TAKE 1 TABLET BY MOUTH THREE TIMES DAILY, Disp: 270 tablet, Rfl: 1    semaglutide (Ozempic) 1 mg/dose (2 mg/1.5 mL) pen injector, Inject 1 mg under the skin 1 (one) time per week., Disp: , Rfl:     tamsulosin (Flomax) 0.4 mg 24 hr capsule, Take 1 capsule by mouth once daily, Disp: 90 capsule, Rfl: 3    True Metrix Glucose Test Strip strip, 1 strip 2 times a day., Disp: , Rfl:     vit A/vit C/vit E/zinc/copper (PRESERVISION AREDS ORAL), Take 2 tablets by mouth once daily., Disp: , Rfl:      Allergies:  Allergies   Allergen Reactions    Penicillins Diarrhea and Unknown    Penicillin Diarrhea        ROS:  Review of systems normal unless stated otherwise in the HPI and/or PMH.    Physical Exam:  There were no vitals taken for this visit. There is no height or weight on file to calculate BMI.     GENERAL APPEARANCE: Well developed and well nourished.  Alert and oriented in no acute distress.  Normal vocal quality.      HEAD/FACE: No erythema or edema or facial tenderness.  Normal facial nerve function bilaterally.    EAR:       EXTERNAL: Normal pinnas and external auditory canals without lesion or obstructing wax.       MIDDLE EAR: Tympanic membranes intact " and mobile with normal landmarks.  Middle ear space appears well aerated.       TUBE STATUS: N/A       MASTOID CAVITY: N/A       HEARING: Gross hearing assessment is within normal limits.      NOSE:       VISUALIZED USING: Anterior rhinoscopy with headlight and nasal speculum.       DORSUM: Midline, nontraumatic appearance.       MUCOSA: Normal-appearing.       SECRETIONS: Normal.       SEPTUM: Left deviation       INFERIOR TURBINATES: Normal.       MIDDLE TURBINATES/MEATUS: N/A       BLEEDING: Clot and bleeding site seen left Kiesselbach's plexus         ORAL CAVITY/PHARYNX:       TEETH: Adequate dentition.       TONGUE: No mass or lesion.  Normal mobility.       FLOOR OF MOUTH: No mass or lesion.       PALATE: Normal hard palate, soft palate, and uvula.       OROPHARYNX: Normal without mass or lesion.       BUCCAL MUCOSA/GBS: Normal without mass or lesion.       LIPS: Normal.    LARYNX/HYPOPHARYNX/NASOPHARYNX: N/A    NECK: No palpable masses or abnormal adenopathy.  Trachea is midline.    THYROID: No thyromegaly or palpable nodule.    SALIVARY GLANDS: Normal bilateral parotid and submandibular glands by inspection and palpation.    TMJ's: Normal.    NEURO: Cranial nerve exam grossly normal bilaterally.     Procedure:  Nasal speculum and headlight was used to identify source of bleeding at the left Kiesselbach's plexus.  After consent and topical anesthesia this area was cauterized with silver nitrate.  Bacitracin ointment was then applied.  There were no complications.    Assessment/Plan   Price was seen today for epistaxis (nose bleed).  Diagnoses and all orders for this visit:  Epistaxis (Primary)  Deviated nasal septum     Left epistaxis identified today and perform complex cautery of this.  Educated over the next week to apply antibiotic ointment twice a day for 1 week.  During this time no lifting, bending, blowing, snorting.  Educated on proper techniques to stop a nosebleed at home.  Follow up if symptoms  worsen or fail to improve.     Edgar Díaz MD

## 2025-01-03 ENCOUNTER — APPOINTMENT (OUTPATIENT)
Dept: OTOLARYNGOLOGY | Facility: CLINIC | Age: OVER 89
End: 2025-01-03
Payer: MEDICARE

## 2025-01-03 VITALS — HEIGHT: 70 IN | TEMPERATURE: 96.8 F | WEIGHT: 170 LBS | BODY MASS INDEX: 24.34 KG/M2

## 2025-01-03 DIAGNOSIS — R04.0 EPISTAXIS: ICD-10-CM

## 2025-01-03 DIAGNOSIS — J34.2 DEVIATED NASAL SEPTUM: Primary | ICD-10-CM

## 2025-01-03 PROCEDURE — 99213 OFFICE O/P EST LOW 20 MIN: CPT | Performed by: OTOLARYNGOLOGY

## 2025-01-03 PROCEDURE — 1157F ADVNC CARE PLAN IN RCRD: CPT | Performed by: OTOLARYNGOLOGY

## 2025-01-03 PROCEDURE — 1159F MED LIST DOCD IN RCRD: CPT | Performed by: OTOLARYNGOLOGY

## 2025-01-03 PROCEDURE — 30903 CONTROL OF NOSEBLEED: CPT | Performed by: OTOLARYNGOLOGY

## 2025-01-03 PROCEDURE — 1036F TOBACCO NON-USER: CPT | Performed by: OTOLARYNGOLOGY

## 2025-01-03 PROCEDURE — 1160F RVW MEDS BY RX/DR IN RCRD: CPT | Performed by: OTOLARYNGOLOGY

## 2025-01-03 RX ORDER — ZINC GLUCONATE 100 MG
100 TABLET ORAL DAILY
COMMUNITY

## 2025-01-03 NOTE — PROGRESS NOTES
Chief Complaint   Patient presents with    Follow-up     LOV 12/24 F/U NOSE BLEEDS, SNEEZED AND GOT A NOSE BLEED THIS MORNING LT. SIDE     HPI:  Price Perez is a 89 y.o. male who again had a left-sided nosebleed again this morning.  Stops on its own.  Previously had left-sided epistaxis which I cauterized on December 24, 2024.  Still no blood thinners.    PMH:  Past Medical History:   Diagnosis Date    DM type 2 (diabetes mellitus, type 2) (Multi)     Gallstone pancreatitis ([facility]) 09/13/2023    History of cholecystectomy 09/13/2023    Hyperlipidemia     Hypertension     Mixed hyperlipidemia     Personal history of other diseases of the circulatory system     History of hypertension    Primary hypertension     Type 2 diabetes mellitus with chronic kidney disease, with long-term current use of insulin, unspecified CKD stage (Multi)     Type 2 diabetes mellitus without complications (Multi) 11/06/2015    Diabetes mellitus    Urinary retention 09/13/2023     Past Surgical History:   Procedure Laterality Date    COLONOSCOPY  01/19/2017    DR SEYMOUR    [facility]    LITHOTRIPSY  1983    OTHER SURGICAL HISTORY  06/10/2021    Cholecystectomy    PROSTATE SURGERY  11/2020         Medications:     Current Outpatient Medications:     aspirin 81 mg chewable tablet, Chew 1 tablet (81 mg) once daily., Disp: , Rfl:     cholecalciferol (Vitamin D3) 50 mcg (2,000 unit) capsule, Take 1 capsule (50 mcg) by mouth early in the morning.., Disp: , Rfl:     FreeStyle Lancets 28 gauge, 1 each 2 times a day., Disp: , Rfl:     insulin glargine (Lantus Solostar U-100 Insulin) 100 unit/mL (3 mL) pen, Inject 19 Units under the skin once daily at bedtime. As directed, Disp: , Rfl:     levothyroxine (Synthroid, Levoxyl) 88 mcg tablet, TAKE 1 TABLET BY MOUTH ONCE DAILY IN THE MORNING ON AN EMPTY STOMACH, Disp: 90 tablet, Rfl: 3    lovastatin (Mevacor) 40 mg tablet, TAKE 1 TABLET BY MOUTH ONCE DAILY WITH EVENING MEAL,  "Disp: 90 tablet, Rfl: 0    magnesium, amino acid chelate, 133 mg tablet, Take 1 tablet (133 mg) by mouth 2 times a day., Disp: , Rfl:     metFORMIN (Glucophage) 500 mg tablet, Take 2 tablets (1,000 mg) by mouth 2 times a day., Disp: 360 tablet, Rfl: 3    multivitamin capsule, Multivitamin, Disp: , Rfl:     omeprazole (PriLOSEC) 40 mg DR capsule, TAKE 1 CAPSULE BY MOUTH ONCE DAILY AS NEEDED, Disp: 90 capsule, Rfl: 1    pen needle, diabetic (BD Ultra-Fine Short Pen Needle) 31 gauge x 5/16\" needle, once daily., Disp: , Rfl:     phytonadione, vit K1, (vitamin k) 100 mcg tablet, Take 1 tablet (100 mcg) by mouth once daily., Disp: , Rfl:     polyethylene glycol (Glycolax, Miralax) 17 gram/dose powder, Take by mouth. As directed, Disp: , Rfl:     repaglinide (Prandin) 2 mg tablet, TAKE 1 TABLET BY MOUTH THREE TIMES DAILY, Disp: 270 tablet, Rfl: 1    semaglutide (Ozempic) 1 mg/dose (2 mg/1.5 mL) pen injector, Inject 1 mg under the skin 1 (one) time per week., Disp: , Rfl:     tamsulosin (Flomax) 0.4 mg 24 hr capsule, Take 1 capsule by mouth once daily, Disp: 90 capsule, Rfl: 3    True Metrix Glucose Test Strip strip, 1 strip 2 times a day., Disp: , Rfl:     vit A/vit C/vit E/zinc/copper (PRESERVISION AREDS ORAL), Take 2 tablets by mouth once daily., Disp: , Rfl:      Allergies:  Allergies   Allergen Reactions    Penicillins Diarrhea and Unknown    Penicillin Diarrhea        ROS:  Review of systems normal unless stated otherwise in the HPI and/or PMH.    Physical Exam:  Temperature 36 °C (96.8 °F), height 1.778 m (5' 10\"), weight 77.1 kg (170 lb). Body mass index is 24.39 kg/m².     GENERAL APPEARANCE: Well developed and well nourished.  Alert and oriented in no acute distress.  Normal vocal quality.      HEAD/FACE: No erythema or edema or facial tenderness.  Normal facial nerve function bilaterally.    EAR:       EXTERNAL: Normal pinnas and external auditory canals without lesion or obstructing wax.       MIDDLE EAR: " Tympanic membranes intact and mobile with normal landmarks.  Middle ear space appears well aerated.       TUBE STATUS: N/A       MASTOID CAVITY: N/A       HEARING: Gross hearing assessment is within normal limits.      NOSE:       VISUALIZED USING: Anterior rhinoscopy with headlight and nasal speculum.       DORSUM: Midline, nontraumatic appearance.       MUCOSA: Normal-appearing.       SECRETIONS: Normal.       SEPTUM: Mild deviation       INFERIOR TURBINATES: Normal.       MIDDLE TURBINATES/MEATUS: N/A       BLEEDING: Bleeding site seen at the previous cautery site but just at the anterior edge         ORAL CAVITY/PHARYNX:       TEETH: Adequate dentition.       TONGUE: No mass or lesion.  Normal mobility.       FLOOR OF MOUTH: No mass or lesion.       PALATE: Normal hard palate, soft palate, and uvula.       OROPHARYNX: Normal without mass or lesion.       BUCCAL MUCOSA/GBS: Normal without mass or lesion.       LIPS: Normal.    LARYNX/HYPOPHARYNX/NASOPHARYNX: N/A    NECK: No palpable masses or abnormal adenopathy.  Trachea is midline.    THYROID: No thyromegaly or palpable nodule.    SALIVARY GLANDS: Normal bilateral parotid and submandibular glands by inspection and palpation.    TMJ's: Normal.    NEURO: Cranial nerve exam grossly normal bilaterally.     Procedure:  Nasal speculum and headlight was used to identify source of bleeding at the left Kiesselbach's plexus.  After consent and topical anesthesia this area was cauterized with silver nitrate.  Bacitracin ointment was then applied.  There were no complications.  Extensive cauterization around the previous cautery site.    Assessment/Plan   Price was seen today for follow-up.  Diagnoses and all orders for this visit:  Deviated nasal septum (Primary)  Epistaxis  -     Referral to ENT     Cauterized again today without complication.  Instructed on antibiotic ointment twice a day for 1 week and during this time no lifting, bending, blowing, snorting.  If he has  any further bleeding saturated cottonball with Afrin in place in the nose for at least 30 minutes.  Follow up if symptoms worsen or fail to improve.     Edgar Díaz MD

## 2025-03-07 ENCOUNTER — OFFICE VISIT (OUTPATIENT)
Dept: PRIMARY CARE | Facility: CLINIC | Age: OVER 89
End: 2025-03-07
Payer: MEDICARE

## 2025-03-07 VITALS
OXYGEN SATURATION: 96 % | BODY MASS INDEX: 24.48 KG/M2 | HEART RATE: 82 BPM | DIASTOLIC BLOOD PRESSURE: 70 MMHG | TEMPERATURE: 98.2 F | WEIGHT: 171 LBS | HEIGHT: 70 IN | SYSTOLIC BLOOD PRESSURE: 136 MMHG

## 2025-03-07 DIAGNOSIS — E11.22 TYPE 2 DIABETES MELLITUS WITH STAGE 3A CHRONIC KIDNEY DISEASE, WITH LONG-TERM CURRENT USE OF INSULIN (MULTI): ICD-10-CM

## 2025-03-07 DIAGNOSIS — Z79.4 TYPE 2 DIABETES MELLITUS WITH CHRONIC KIDNEY DISEASE, WITH LONG-TERM CURRENT USE OF INSULIN, UNSPECIFIED CKD STAGE (MULTI): Chronic | ICD-10-CM

## 2025-03-07 DIAGNOSIS — Z79.4 TYPE 2 DIABETES MELLITUS WITH STAGE 3A CHRONIC KIDNEY DISEASE, WITH LONG-TERM CURRENT USE OF INSULIN (MULTI): ICD-10-CM

## 2025-03-07 DIAGNOSIS — E78.2 MIXED HYPERLIPIDEMIA: Chronic | ICD-10-CM

## 2025-03-07 DIAGNOSIS — I10 PRIMARY HYPERTENSION: Chronic | ICD-10-CM

## 2025-03-07 DIAGNOSIS — L56.8 PHOTODERMATITIS: ICD-10-CM

## 2025-03-07 DIAGNOSIS — Z00.00 ROUTINE GENERAL MEDICAL EXAMINATION AT HEALTH CARE FACILITY: Primary | ICD-10-CM

## 2025-03-07 DIAGNOSIS — N18.31 TYPE 2 DIABETES MELLITUS WITH STAGE 3A CHRONIC KIDNEY DISEASE, WITH LONG-TERM CURRENT USE OF INSULIN (MULTI): ICD-10-CM

## 2025-03-07 DIAGNOSIS — E03.9 ACQUIRED HYPOTHYROIDISM: Chronic | ICD-10-CM

## 2025-03-07 DIAGNOSIS — K21.9 GASTROESOPHAGEAL REFLUX DISEASE, UNSPECIFIED WHETHER ESOPHAGITIS PRESENT: Chronic | ICD-10-CM

## 2025-03-07 DIAGNOSIS — E11.22 TYPE 2 DIABETES MELLITUS WITH CHRONIC KIDNEY DISEASE, WITH LONG-TERM CURRENT USE OF INSULIN, UNSPECIFIED CKD STAGE (MULTI): Chronic | ICD-10-CM

## 2025-03-07 LAB — POC HEMOGLOBIN A1C: 8.4 % (ref 4.2–6.5)

## 2025-03-07 PROCEDURE — 1159F MED LIST DOCD IN RCRD: CPT | Performed by: FAMILY MEDICINE

## 2025-03-07 PROCEDURE — 1160F RVW MEDS BY RX/DR IN RCRD: CPT | Performed by: FAMILY MEDICINE

## 2025-03-07 PROCEDURE — 1170F FXNL STATUS ASSESSED: CPT | Performed by: FAMILY MEDICINE

## 2025-03-07 PROCEDURE — 99215 OFFICE O/P EST HI 40 MIN: CPT | Mod: 25 | Performed by: FAMILY MEDICINE

## 2025-03-07 PROCEDURE — 99214 OFFICE O/P EST MOD 30 MIN: CPT | Performed by: FAMILY MEDICINE

## 2025-03-07 PROCEDURE — G0439 PPPS, SUBSEQ VISIT: HCPCS | Performed by: FAMILY MEDICINE

## 2025-03-07 PROCEDURE — 1036F TOBACCO NON-USER: CPT | Performed by: FAMILY MEDICINE

## 2025-03-07 PROCEDURE — 3075F SYST BP GE 130 - 139MM HG: CPT | Performed by: FAMILY MEDICINE

## 2025-03-07 PROCEDURE — 1157F ADVNC CARE PLAN IN RCRD: CPT | Performed by: FAMILY MEDICINE

## 2025-03-07 PROCEDURE — 83036 HEMOGLOBIN GLYCOSYLATED A1C: CPT | Performed by: FAMILY MEDICINE

## 2025-03-07 PROCEDURE — 3078F DIAST BP <80 MM HG: CPT | Performed by: FAMILY MEDICINE

## 2025-03-07 PROCEDURE — 1126F AMNT PAIN NOTED NONE PRSNT: CPT | Performed by: FAMILY MEDICINE

## 2025-03-07 RX ORDER — METFORMIN HYDROCHLORIDE 500 MG/1
1000 TABLET ORAL 2 TIMES DAILY
Qty: 360 TABLET | Refills: 3 | Status: SHIPPED | OUTPATIENT
Start: 2025-03-07

## 2025-03-07 RX ORDER — METFORMIN HYDROCHLORIDE 500 MG/1
500 TABLET ORAL 2 TIMES DAILY
Qty: 360 TABLET | Refills: 3 | Status: SHIPPED | OUTPATIENT
Start: 2025-03-07 | End: 2025-03-07

## 2025-03-07 ASSESSMENT — ENCOUNTER SYMPTOMS
LOSS OF SENSATION IN FEET: 0
ENDOCRINE NEGATIVE: 1
NEUROLOGICAL NEGATIVE: 1
CONSTITUTIONAL NEGATIVE: 1
OCCASIONAL FEELINGS OF UNSTEADINESS: 0
GASTROINTESTINAL NEGATIVE: 1
PSYCHIATRIC NEGATIVE: 1
RESPIRATORY NEGATIVE: 1
DEPRESSION: 0
EYES NEGATIVE: 1
ALLERGIC/IMMUNOLOGIC NEGATIVE: 1
MUSCULOSKELETAL NEGATIVE: 1

## 2025-03-07 ASSESSMENT — COLUMBIA-SUICIDE SEVERITY RATING SCALE - C-SSRS
6. HAVE YOU EVER DONE ANYTHING, STARTED TO DO ANYTHING, OR PREPARED TO DO ANYTHING TO END YOUR LIFE?: NO
1. IN THE PAST MONTH, HAVE YOU WISHED YOU WERE DEAD OR WISHED YOU COULD GO TO SLEEP AND NOT WAKE UP?: NO
2. HAVE YOU ACTUALLY HAD ANY THOUGHTS OF KILLING YOURSELF?: NO

## 2025-03-07 ASSESSMENT — PAIN SCALES - GENERAL: PAINLEVEL_OUTOF10: 0-NO PAIN

## 2025-03-07 ASSESSMENT — ACTIVITIES OF DAILY LIVING (ADL)
BATHING: INDEPENDENT
DRESSING: INDEPENDENT

## 2025-03-07 NOTE — ASSESSMENT & PLAN NOTE
Orders:    POCT glycosylated hemoglobin (Hb A1C) manually resulted    Urinalysis with Reflex Microscopic; Future    Albumin-Creatinine Ratio, Urine Random; Future    Comprehensive Metabolic Panel; Future    CBC and Auto Differential; Future    TSH with reflex to Free T4 if abnormal; Future    Lipid Panel; Future

## 2025-03-07 NOTE — PROGRESS NOTES
"Subjective   Reason for Visit: Price Perez is an 89 y.o. male here for a Medicare Wellness visit.     Past Medical, Surgical, and Family History reviewed and updated in chart.    Reviewed all medications by prescribing practitioner or clinical pharmacist (such as prescriptions, OTCs, herbal therapies and supplements) and documented in the medical record.    HPI  DM2:   -Chronic , stable  -A1c today:  8.4  - Frequency of blood glucose monitoring:  -Hypoglycemic episodes: infrequent  -Concerns:  none  -Tolerating meds: yes  -Eye Exam: (recommend yearly)  -Foot Exam: (recommend yearly) 3/2025  Hypertension :   -Patient is here for follow-up hypertension: chronic , stable  -Tolerating medications without side effects  Dyslipidemia  -Patient presents for follow up chronic ,stable problem  -Concerns: none      Hypothyroidism:  -Chronic stable problem  -Tolerating medication, due for tsh  -Concerns: none        Patient Care Team:  Andres Lopez MD as PCP - General (Family Medicine)     Review of Systems   Constitutional: Negative.    HENT: Negative.     Eyes: Negative.    Respiratory: Negative.     Gastrointestinal: Negative.    Endocrine: Negative.    Genitourinary: Negative.    Musculoskeletal: Negative.    Skin: Negative.    Allergic/Immunologic: Negative.    Neurological: Negative.    Psychiatric/Behavioral: Negative.         Objective   Vitals:  /70   Pulse 82   Temp 36.8 °C (98.2 °F) (Temporal)   Ht 1.778 m (5' 10\")   Wt 77.6 kg (171 lb)   SpO2 96%   BMI 24.54 kg/m²       Physical Exam  Vitals reviewed.   Constitutional:       Appearance: Normal appearance.   HENT:      Right Ear: Tympanic membrane, ear canal and external ear normal.      Left Ear: Tympanic membrane, ear canal and external ear normal.      Nose: Nose normal.      Mouth/Throat:      Mouth: Mucous membranes are moist.   Eyes:      Extraocular Movements: Extraocular movements intact.      Conjunctiva/sclera: Conjunctivae normal.     "  Pupils: Pupils are equal, round, and reactive to light.   Cardiovascular:      Rate and Rhythm: Normal rate and regular rhythm.      Pulses: Normal pulses.           Dorsalis pedis pulses are 2+ on the right side and 2+ on the left side.        Posterior tibial pulses are 2+ on the right side and 2+ on the left side.      Heart sounds: Normal heart sounds.   Pulmonary:      Breath sounds: Normal breath sounds.   Abdominal:      General: Abdomen is flat. Bowel sounds are normal.      Palpations: Abdomen is soft.   Musculoskeletal:         General: Normal range of motion.      Cervical back: Neck supple.   Feet:      Right foot:      Protective Sensation: 6 sites tested.        Skin integrity: Skin integrity normal.      Left foot:      Protective Sensation: 6 sites tested.        Skin integrity: Skin integrity normal.   Skin:     General: Skin is warm.   Neurological:      General: No focal deficit present.      Mental Status: He is alert and oriented to person, place, and time.      Cranial Nerves: Cranial nerves 2-12 are intact.   Psychiatric:         Attention and Perception: Attention normal.         Mood and Affect: Mood normal.         Assessment & Plan  Routine general medical examination at health care facility    Orders:    1 Year Follow Up In Primary Care - Wellness Exam    1 Year Follow Up In Primary Care - Wellness Exam; Future    Type 2 diabetes mellitus with stage 3a chronic kidney disease, with long-term current use of insulin (Multi)    Orders:    POCT glycosylated hemoglobin (Hb A1C) manually resulted    Urinalysis with Reflex Microscopic; Future    Albumin-Creatinine Ratio, Urine Random; Future    Comprehensive Metabolic Panel; Future    CBC and Auto Differential; Future    TSH with reflex to Free T4 if abnormal; Future    Lipid Panel; Future    Primary hypertension    Orders:    Urinalysis with Reflex Microscopic; Future    Albumin-Creatinine Ratio, Urine Random; Future    Comprehensive Metabolic  Panel; Future    CBC and Auto Differential; Future    TSH with reflex to Free T4 if abnormal; Future    Lipid Panel; Future    Mixed hyperlipidemia    Orders:    Urinalysis with Reflex Microscopic; Future    Albumin-Creatinine Ratio, Urine Random; Future    Comprehensive Metabolic Panel; Future    CBC and Auto Differential; Future    TSH with reflex to Free T4 if abnormal; Future    Lipid Panel; Future    Acquired hypothyroidism    Orders:    Urinalysis with Reflex Microscopic; Future    Albumin-Creatinine Ratio, Urine Random; Future    Comprehensive Metabolic Panel; Future    CBC and Auto Differential; Future    TSH with reflex to Free T4 if abnormal; Future    Lipid Panel; Future    Gastroesophageal reflux disease, unspecified whether esophagitis present         Type 2 diabetes mellitus with chronic kidney disease, with long-term current use of insulin, unspecified CKD stage (Multi)    Orders:    metFORMIN (Glucophage) 500 mg tablet; Take 2 tablets (1,000 mg) by mouth 2 times a day.    Photodermatitis    Orders:    metFORMIN (Glucophage) 500 mg tablet; Take 2 tablets (1,000 mg) by mouth 2 times a day.    Patient was supposed to be taking metformin 2  500 mg tablets twice daily but has not, since his A1c is slightly elevated at 8.4 recommend he   Increase metformin to 2  500 mg tablets twice daily  Cont meds  Rto 3mo

## 2025-03-07 NOTE — ASSESSMENT & PLAN NOTE
Orders:    Urinalysis with Reflex Microscopic; Future    Albumin-Creatinine Ratio, Urine Random; Future    Comprehensive Metabolic Panel; Future    CBC and Auto Differential; Future    TSH with reflex to Free T4 if abnormal; Future    Lipid Panel; Future

## 2025-03-12 LAB
ALBUMIN SERPL-MCNC: 3.8 G/DL (ref 3.6–5.1)
ALBUMIN/CREAT UR: 48 MG/G CREAT
ALP SERPL-CCNC: 69 U/L (ref 35–144)
ALT SERPL-CCNC: 14 U/L (ref 9–46)
ANION GAP SERPL CALCULATED.4IONS-SCNC: 10 MMOL/L (CALC) (ref 7–17)
APPEARANCE UR: CLEAR
AST SERPL-CCNC: 15 U/L (ref 10–35)
BACTERIA #/AREA URNS HPF: ABNORMAL /HPF
BASOPHILS # BLD AUTO: 41 CELLS/UL (ref 0–200)
BASOPHILS NFR BLD AUTO: 0.5 %
BILIRUB SERPL-MCNC: 0.3 MG/DL (ref 0.2–1.2)
BILIRUB UR QL STRIP: NEGATIVE
BUN SERPL-MCNC: 21 MG/DL (ref 7–25)
CALCIUM SERPL-MCNC: 9 MG/DL (ref 8.6–10.3)
CHLORIDE SERPL-SCNC: 101 MMOL/L (ref 98–110)
CHOLEST SERPL-MCNC: 138 MG/DL
CHOLEST/HDLC SERPL: 3.5 (CALC)
CO2 SERPL-SCNC: 25 MMOL/L (ref 20–32)
COLOR UR: YELLOW
CREAT SERPL-MCNC: 1.34 MG/DL (ref 0.7–1.22)
CREAT UR-MCNC: 163 MG/DL (ref 20–320)
EGFRCR SERPLBLD CKD-EPI 2021: 50 ML/MIN/1.73M2
EOSINOPHIL # BLD AUTO: 180 CELLS/UL (ref 15–500)
EOSINOPHIL NFR BLD AUTO: 2.2 %
ERYTHROCYTE [DISTWIDTH] IN BLOOD BY AUTOMATED COUNT: 13.1 % (ref 11–15)
GLUCOSE SERPL-MCNC: 268 MG/DL (ref 65–99)
GLUCOSE UR QL STRIP: ABNORMAL
HCT VFR BLD AUTO: 37.8 % (ref 38.5–50)
HDLC SERPL-MCNC: 39 MG/DL
HGB BLD-MCNC: 11.7 G/DL (ref 13.2–17.1)
HGB UR QL STRIP: NEGATIVE
HYALINE CASTS #/AREA URNS LPF: ABNORMAL /LPF
KETONES UR QL STRIP: ABNORMAL
LDLC SERPL CALC-MCNC: 74 MG/DL (CALC)
LEUKOCYTE ESTERASE UR QL STRIP: NEGATIVE
LYMPHOCYTES # BLD AUTO: 2665 CELLS/UL (ref 850–3900)
LYMPHOCYTES NFR BLD AUTO: 32.5 %
MCH RBC QN AUTO: 27 PG (ref 27–33)
MCHC RBC AUTO-ENTMCNC: 31 G/DL (ref 32–36)
MCV RBC AUTO: 87.3 FL (ref 80–100)
MICROALBUMIN UR-MCNC: 7.8 MG/DL
MONOCYTES # BLD AUTO: 664 CELLS/UL (ref 200–950)
MONOCYTES NFR BLD AUTO: 8.1 %
NEUTROPHILS # BLD AUTO: 4649 CELLS/UL (ref 1500–7800)
NEUTROPHILS NFR BLD AUTO: 56.7 %
NITRITE UR QL STRIP: NEGATIVE
NONHDLC SERPL-MCNC: 99 MG/DL (CALC)
PH UR STRIP: 5.5 [PH] (ref 5–8)
PLATELET # BLD AUTO: 207 THOUSAND/UL (ref 140–400)
PMV BLD REES-ECKER: 10.7 FL (ref 7.5–12.5)
POTASSIUM SERPL-SCNC: 4.6 MMOL/L (ref 3.5–5.3)
PROT SERPL-MCNC: 7.4 G/DL (ref 6.1–8.1)
PROT UR QL STRIP: ABNORMAL
RBC # BLD AUTO: 4.33 MILLION/UL (ref 4.2–5.8)
RBC #/AREA URNS HPF: ABNORMAL /HPF
SERVICE CMNT-IMP: ABNORMAL
SODIUM SERPL-SCNC: 136 MMOL/L (ref 135–146)
SP GR UR STRIP: 1.03 (ref 1–1.03)
SQUAMOUS #/AREA URNS HPF: ABNORMAL /HPF
TRIGL SERPL-MCNC: 168 MG/DL
TSH SERPL-ACNC: 2.98 MIU/L (ref 0.4–4.5)
WBC # BLD AUTO: 8.2 THOUSAND/UL (ref 3.8–10.8)
WBC #/AREA URNS HPF: ABNORMAL /HPF

## 2025-03-13 NOTE — PROGRESS NOTES
HPI   89 yo with Diabetes 2 (dx '95), HTN, Dyslipidemia, hashimoto's dz presents for 6 month follow up. Last A1c-7.8%, this month 8.4%.      Pt is testing sugars 4 times per day with galina 2. Pt is having low sugars 0-1 times/week.   Sugars in mid 100's prior to meals, up to 200 post meal.  Pt is trying to follow a carb controlled diet and still doesn't know reasonable carb allowances. Pt is able to afford certain medications. Pt is exercising with walking regularly.           Lantus 24 units, metformin 500mg (4-recently was taking 2), prandin (2mg) 2 pills breakfast/1-2 pills dinner, ozempic 1mg weekly.   Pt had too many lows on glipizide.   Had pancreatitis hx in 2020 (although it appears it was prior to having gallbladder out).  Had started jardiance in 2021, [facility] and pt states too expensive to restart in 2022.           Basal bolus would be ideal in this situation, pt does not want to do that.     -pt forgot galina today     -had suggested b12 1mg every day, still not taking     -taking levothyroxine 88mcg every day, euthyroid     -taking lovastatin 40 mg for statin and tolerating      Current Outpatient Medications:     aspirin 81 mg chewable tablet, Chew 1 tablet (81 mg) once daily., Disp: , Rfl:     cholecalciferol (Vitamin D3) 50 mcg (2,000 unit) capsule, Take 1 capsule (50 mcg) by mouth early in the morning.., Disp: , Rfl:     FreeStyle Lancets 28 gauge, 1 each 2 times a day., Disp: , Rfl:     insulin glargine (Lantus Solostar U-100 Insulin) 100 unit/mL (3 mL) pen, Inject 19 Units under the skin once daily at bedtime. As directed, Disp: , Rfl:     levothyroxine (Synthroid, Levoxyl) 88 mcg tablet, TAKE 1 TABLET BY MOUTH ONCE DAILY IN THE MORNING ON AN EMPTY STOMACH, Disp: 90 tablet, Rfl: 3    lovastatin (Mevacor) 40 mg tablet, TAKE 1 TABLET BY MOUTH ONCE DAILY WITH EVENING MEAL, Disp: 90 tablet, Rfl: 0    magnesium, amino acid chelate, 133 mg tablet, Take 1 tablet (133 mg) by mouth 2 times a  "day., Disp: , Rfl:     metFORMIN (Glucophage) 500 mg tablet, Take 2 tablets (1,000 mg) by mouth 2 times a day., Disp: 360 tablet, Rfl: 3    multivitamin capsule, Multivitamin, Disp: , Rfl:     omeprazole (PriLOSEC) 40 mg DR capsule, TAKE 1 CAPSULE BY MOUTH ONCE DAILY AS NEEDED, Disp: 90 capsule, Rfl: 1    pen needle, diabetic (BD Ultra-Fine Short Pen Needle) 31 gauge x 5/16\" needle, once daily., Disp: , Rfl:     phytonadione, vit K1, (vitamin k) 100 mcg tablet, Take 1 tablet (100 mcg) by mouth once daily., Disp: , Rfl:     polyethylene glycol (Glycolax, Miralax) 17 gram/dose powder, Take by mouth. As directed, Disp: , Rfl:     repaglinide (Prandin) 2 mg tablet, TAKE 1 TABLET BY MOUTH THREE TIMES DAILY, Disp: 270 tablet, Rfl: 1    semaglutide (Ozempic) 1 mg/dose (2 mg/1.5 mL) pen injector, Inject 1 mg under the skin 1 (one) time per week., Disp: , Rfl:     tamsulosin (Flomax) 0.4 mg 24 hr capsule, Take 1 capsule by mouth once daily, Disp: 90 capsule, Rfl: 3    True Metrix Glucose Test Strip strip, 1 strip 2 times a day., Disp: , Rfl:     vit A/vit C/vit E/zinc/copper (PRESERVISION AREDS ORAL), Take 2 tablets by mouth once daily., Disp: , Rfl:       Allergies as of 03/14/2025 - Reviewed 03/14/2025   Allergen Reaction Noted    Penicillins Diarrhea and Unknown 09/13/2023    Penicillin Diarrhea 09/13/2023         Review of Systems   Cardiology: Lightheadedness-denies.  Chest pain-denies.  Leg edema-denies.  Palpitations-denies.  Respiratory: Cough-denies. Shortness of breath-denies.  Wheezing-denies.  Gastroenterology: Constipation-denies.  Diarrhea-denies.  Heartburn-denies.  Endocrinology: Cold intolerance-denies.  Heat intolerance-denies.  Sweats-denies.  Neurology: Headache-denies.  Tremor-denies.  Neuropathy in extremities-denies.  Psychology: Low energy-denies.  Irritability-denies.  Sleep disturbances-denies.      /65 (BP Location: Right arm, Patient Position: Sitting)   Pulse 78   Wt 75.3 kg (166 lb)   " BMI 23.82 kg/m²       Labs:  Lab Results   Component Value Date    WBC 8.2 03/11/2025    NRBC 0.0 09/09/2024    RBC 4.33 03/11/2025    HGB 11.7 (L) 03/11/2025    HCT 37.8 (L) 03/11/2025     03/11/2025     Lab Results   Component Value Date    CALCIUM 9.0 03/11/2025    AST 15 03/11/2025    ALKPHOS 69 03/11/2025    BILITOT 0.3 03/11/2025    PROT 7.4 03/11/2025    ALBUMIN 3.8 03/11/2025    GLOB 3.5 08/30/2023    AGR 1.1 (L) 08/30/2023     03/11/2025    K 4.6 03/11/2025     03/11/2025    CO2 25 03/11/2025    ANIONGAP 10 03/11/2025    BUN 21 03/11/2025    CREATININE 1.34 (H) 03/11/2025    UREACREAUR 11.7 08/30/2023    GLUCOSE 268 (H) 03/11/2025    ALT 14 03/11/2025    EGFR 50 (L) 03/11/2025     Lab Results   Component Value Date    CHOL 138 03/11/2025    TRIG 168 (H) 03/11/2025    HDL 39 (L) 03/11/2025    LDLCALC 74 03/11/2025     Lab Results   Component Value Date    MICROALBCREA 48 (H) 03/11/2025     Lab Results   Component Value Date    TSH 2.98 03/11/2025     Lab Results   Component Value Date    HHGLTJYF00 292 07/19/2022     Lab Results   Component Value Date    HGBA1C 8.4 (A) 03/07/2025         Physical Exam   General Appearance: pleasant, cooperative, no acute distress  HEENT: no chemosis, no proptosis, no lid lag, no lid retraction  Neck: no lymphadenopathy, no thyromegaly, no dominant thyroid nodules  Heart: no murmurs, regular rate and rhythm, S1 and S2  Lungs: no wheezes, no rhonci, no rales  Extremities: no lower extremity swelling      Assessment/Plan   1. Type 2 diabetes mellitus with chronic kidney disease, with long-term current use of insulin, unspecified CKD stage (Multi) (Primary)  -A1c reviewed  -labs reviewed  -glycemic values reviewed per pt's memory    -needs to bring galina  -based on age/duration dm2/levels needs prandial insulin-until he does this there will be minimal improvement    -pt back on full dose metformin 500mg (4 instead of 2 pills), should help glycemic control  slightly    2. Primary hypertension  -at target on therapy, will follow, no change    3. Mixed hyperlipidemia  -on statin and tolerating, labs reviewed, will follow         Follow Up:  Lacho 6 months    Medical Decision Making  Complexity of problem: Chronic illness of diabetes mellitus uncontrolled, progressing  Data analyzed and reviewed: Reviewed prior notes, blood glucose data, labs including HgbA1c, lipids, serum chemistries.  Ordered tests.   Risk of complications and morbidities: Is definite because of use of insulin and risk of hypoglycemia.  Prescription medications reviewed and modifications made.  Compliance assessed.  Addressed social determinants of health including food insecurity.

## 2025-03-14 ENCOUNTER — APPOINTMENT (OUTPATIENT)
Dept: ENDOCRINOLOGY | Facility: CLINIC | Age: OVER 89
End: 2025-03-14
Payer: MEDICARE

## 2025-03-14 VITALS
DIASTOLIC BLOOD PRESSURE: 65 MMHG | HEART RATE: 78 BPM | WEIGHT: 166 LBS | BODY MASS INDEX: 23.82 KG/M2 | SYSTOLIC BLOOD PRESSURE: 137 MMHG

## 2025-03-14 DIAGNOSIS — E11.22 TYPE 2 DIABETES MELLITUS WITH CHRONIC KIDNEY DISEASE, WITH LONG-TERM CURRENT USE OF INSULIN, UNSPECIFIED CKD STAGE (MULTI): Primary | ICD-10-CM

## 2025-03-14 DIAGNOSIS — E78.2 MIXED HYPERLIPIDEMIA: ICD-10-CM

## 2025-03-14 DIAGNOSIS — Z79.4 TYPE 2 DIABETES MELLITUS WITH CHRONIC KIDNEY DISEASE, WITH LONG-TERM CURRENT USE OF INSULIN, UNSPECIFIED CKD STAGE (MULTI): Primary | ICD-10-CM

## 2025-03-14 DIAGNOSIS — I10 PRIMARY HYPERTENSION: ICD-10-CM

## 2025-03-14 PROCEDURE — 1157F ADVNC CARE PLAN IN RCRD: CPT | Performed by: INTERNAL MEDICINE

## 2025-03-14 PROCEDURE — 1126F AMNT PAIN NOTED NONE PRSNT: CPT | Performed by: INTERNAL MEDICINE

## 2025-03-14 PROCEDURE — 3075F SYST BP GE 130 - 139MM HG: CPT | Performed by: INTERNAL MEDICINE

## 2025-03-14 PROCEDURE — 99214 OFFICE O/P EST MOD 30 MIN: CPT | Performed by: INTERNAL MEDICINE

## 2025-03-14 PROCEDURE — 3078F DIAST BP <80 MM HG: CPT | Performed by: INTERNAL MEDICINE

## 2025-03-14 ASSESSMENT — ENCOUNTER SYMPTOMS
OCCASIONAL FEELINGS OF UNSTEADINESS: 1
LOSS OF SENSATION IN FEET: 0
DEPRESSION: 0

## 2025-03-14 ASSESSMENT — PATIENT HEALTH QUESTIONNAIRE - PHQ9
1. LITTLE INTEREST OR PLEASURE IN DOING THINGS: NOT AT ALL
SUM OF ALL RESPONSES TO PHQ9 QUESTIONS 1 & 2: 0
2. FEELING DOWN, DEPRESSED OR HOPELESS: NOT AT ALL

## 2025-03-14 ASSESSMENT — PAIN SCALES - GENERAL: PAINLEVEL_OUTOF10: 0-NO PAIN

## 2025-03-25 ENCOUNTER — TELEPHONE (OUTPATIENT)
Dept: PRIMARY CARE | Facility: CLINIC | Age: OVER 89
End: 2025-03-25

## 2025-03-25 ENCOUNTER — OFFICE VISIT (OUTPATIENT)
Dept: PRIMARY CARE | Facility: CLINIC | Age: OVER 89
End: 2025-03-25
Payer: MEDICARE

## 2025-03-25 VITALS
BODY MASS INDEX: 23.8 KG/M2 | TEMPERATURE: 98.1 F | HEART RATE: 75 BPM | HEIGHT: 71 IN | OXYGEN SATURATION: 100 % | SYSTOLIC BLOOD PRESSURE: 120 MMHG | DIASTOLIC BLOOD PRESSURE: 72 MMHG | WEIGHT: 170 LBS

## 2025-03-25 DIAGNOSIS — N48.1 BALANITIS: Primary | ICD-10-CM

## 2025-03-25 DIAGNOSIS — R82.81 PYURIA: ICD-10-CM

## 2025-03-25 LAB
POC APPEARANCE, URINE: CLEAR
POC BILIRUBIN, URINE: NEGATIVE
POC BLOOD, URINE: NEGATIVE
POC COLOR, URINE: YELLOW
POC GLUCOSE, URINE: NEGATIVE MG/DL
POC KETONES, URINE: NEGATIVE MG/DL
POC LEUKOCYTES, URINE: NEGATIVE
POC NITRITE,URINE: NEGATIVE
POC PH, URINE: 6 PH
POC PROTEIN, URINE: NEGATIVE MG/DL
POC SPECIFIC GRAVITY, URINE: 1.01
POC UROBILINOGEN, URINE: 0.2 EU/DL

## 2025-03-25 PROCEDURE — 3074F SYST BP LT 130 MM HG: CPT | Performed by: FAMILY MEDICINE

## 2025-03-25 PROCEDURE — 99213 OFFICE O/P EST LOW 20 MIN: CPT | Performed by: FAMILY MEDICINE

## 2025-03-25 PROCEDURE — 3078F DIAST BP <80 MM HG: CPT | Performed by: FAMILY MEDICINE

## 2025-03-25 PROCEDURE — 1036F TOBACCO NON-USER: CPT | Performed by: FAMILY MEDICINE

## 2025-03-25 PROCEDURE — 81003 URINALYSIS AUTO W/O SCOPE: CPT | Performed by: FAMILY MEDICINE

## 2025-03-25 PROCEDURE — 1160F RVW MEDS BY RX/DR IN RCRD: CPT | Performed by: FAMILY MEDICINE

## 2025-03-25 PROCEDURE — 1125F AMNT PAIN NOTED PAIN PRSNT: CPT | Performed by: FAMILY MEDICINE

## 2025-03-25 PROCEDURE — 1159F MED LIST DOCD IN RCRD: CPT | Performed by: FAMILY MEDICINE

## 2025-03-25 PROCEDURE — 1157F ADVNC CARE PLAN IN RCRD: CPT | Performed by: FAMILY MEDICINE

## 2025-03-25 PROCEDURE — G2211 COMPLEX E/M VISIT ADD ON: HCPCS | Performed by: FAMILY MEDICINE

## 2025-03-25 RX ORDER — NYSTATIN AND TRIAMCINOLONE ACETONIDE 100000; 1 [USP'U]/G; MG/G
CREAM TOPICAL DAILY
Qty: 15 G | Refills: 0 | Status: SHIPPED | OUTPATIENT
Start: 2025-03-25

## 2025-03-25 ASSESSMENT — LIFESTYLE VARIABLES
HOW OFTEN DO YOU HAVE SIX OR MORE DRINKS ON ONE OCCASION: WEEKLY
AUDIT-C TOTAL SCORE: 5
HOW OFTEN DO YOU HAVE A DRINK CONTAINING ALCOHOL: 2-4 TIMES A MONTH
HOW MANY STANDARD DRINKS CONTAINING ALCOHOL DO YOU HAVE ON A TYPICAL DAY: 1 OR 2
SKIP TO QUESTIONS 9-10: 0

## 2025-03-25 ASSESSMENT — ENCOUNTER SYMPTOMS
FREQUENCY: 1
LOSS OF SENSATION IN FEET: 0
OCCASIONAL FEELINGS OF UNSTEADINESS: 0
FLANK PAIN: 0
DEPRESSION: 0

## 2025-03-25 ASSESSMENT — PAIN SCALES - GENERAL: PAINLEVEL_OUTOF10: 2

## 2025-03-25 NOTE — TELEPHONE ENCOUNTER
Pt son humble called stating that his father has been having some UTI symptoms since yesterday. States that there is some slight burning, frequency is the same when it comes to how often he is going to the bathroom. Advised him to drink some water and we will see if we can get him in today    Cedrick number is [phone]( he will bring him to his appt)     I saw that we could possibly do 3:45 today I did ask Barb but was told to send message back just in case.

## 2025-03-25 NOTE — PROGRESS NOTES
"Subjective   Patient ID: Price Perez is a 89 y.o. male who presents for UTI (Pain on head of penis per pt pain at a 2).    UTI   This is a new problem. There has been no fever. Associated symptoms include frequency and urgency. Pertinent negatives include no discharge or flank pain. The treatment provided no relief.    Tip of penis is discolored x 1 mo, no new soaps detergents,  Not sexually active/masturbation  Review of Systems   Genitourinary:  Positive for frequency and urgency. Negative for flank pain.       Objective   /72   Pulse 75   Temp 36.7 °C (98.1 °F)   Ht 1.791 m (5' 10.5\")   Wt 77.1 kg (170 lb)   SpO2 100%   BMI 24.05 kg/m²     Physical Exam  Vitals reviewed.   Constitutional:       Appearance: Normal appearance.   Cardiovascular:      Rate and Rhythm: Normal rate and regular rhythm.      Heart sounds: Normal heart sounds. No murmur heard.  Pulmonary:      Breath sounds: Normal breath sounds.   Abdominal:      General: Abdomen is flat. Bowel sounds are normal.      Palpations: Abdomen is soft.   Genitourinary:     Penis: Circumcised. Erythema present. No phimosis, paraphimosis, hypospadias, tenderness, discharge, swelling or lesions.       Testes: Normal.   Musculoskeletal:         General: No swelling.   Neurological:      Mental Status: He is alert.       Sl whitend/redish glans of penis without any masses or discharge  Assessment/Plan   Diagnoses and all orders for this visit:  Balanitis  -     nystatin-triamcinolone (Mycolog II) cream; Apply topically early in the morning.. Apply to affect area twice a day for 7 days then as needed for rash.  Pyuria  -     POCT UA Automated manually resulted         "

## 2025-03-25 NOTE — TELEPHONE ENCOUNTER
[facility] [place] [facility] [place] call back to go over the medication we called in they are wanting to know if the instructions can be part in the morning and then part at night and does it need to be a pea size amount of cream ?     Their number is [phone] ([email or web address])    nystatin-triamcinolone (Mycolog II) cream Apply topically early in the morning.. Apply to affect area twice a day for 7 days then as needed for rash.

## 2025-05-31 DIAGNOSIS — K21.9 GASTROESOPHAGEAL REFLUX DISEASE, UNSPECIFIED WHETHER ESOPHAGITIS PRESENT: Chronic | ICD-10-CM

## 2025-06-01 RX ORDER — OMEPRAZOLE 40 MG/1
40 CAPSULE, DELAYED RELEASE ORAL DAILY PRN
Qty: 90 CAPSULE | Refills: 1 | Status: SHIPPED | OUTPATIENT
Start: 2025-06-01

## 2025-06-04 DIAGNOSIS — E11.22 TYPE 2 DIABETES MELLITUS WITH CHRONIC KIDNEY DISEASE, WITH LONG-TERM CURRENT USE OF INSULIN, UNSPECIFIED CKD STAGE (MULTI): ICD-10-CM

## 2025-06-04 DIAGNOSIS — Z79.4 TYPE 2 DIABETES MELLITUS WITH CHRONIC KIDNEY DISEASE, WITH LONG-TERM CURRENT USE OF INSULIN, UNSPECIFIED CKD STAGE (MULTI): ICD-10-CM

## 2025-06-04 DIAGNOSIS — E03.9 ACQUIRED HYPOTHYROIDISM: Chronic | ICD-10-CM

## 2025-06-04 RX ORDER — LOVASTATIN 40 MG/1
40 TABLET ORAL
Qty: 90 TABLET | Refills: 0 | Status: SHIPPED | OUTPATIENT
Start: 2025-06-04

## 2025-06-04 RX ORDER — LEVOTHYROXINE SODIUM 88 UG/1
88 TABLET ORAL
Qty: 90 TABLET | Refills: 3 | Status: SHIPPED | OUTPATIENT
Start: 2025-06-04

## 2025-06-09 ENCOUNTER — TELEPHONE (OUTPATIENT)
Dept: PRIMARY CARE | Facility: CLINIC | Age: OVER 89
End: 2025-06-09
Payer: MEDICARE

## 2025-06-09 NOTE — TELEPHONE ENCOUNTER
She said his cat attacked him Saturday and he has a couple deep bites. He has been cleaning with peroxide. She called the vet and the cat last had shots in Nov 2023 so the vet said to call our office as he is at risk for infection. She said there is a little redness but she hasn't really seen it herself. I put him on at end of day tomorrow. His tetanus is current. He has dm fu Friday and she wanted to do both appts tomorrow but I told her I didn't think that was possible. Call if any thing is different than what I discussed with her.

## 2025-06-10 ENCOUNTER — OFFICE VISIT (OUTPATIENT)
Dept: PRIMARY CARE | Facility: CLINIC | Age: OVER 89
End: 2025-06-10
Payer: MEDICARE

## 2025-06-10 VITALS
BODY MASS INDEX: 24.42 KG/M2 | WEIGHT: 172.6 LBS | OXYGEN SATURATION: 96 % | HEART RATE: 88 BPM | DIASTOLIC BLOOD PRESSURE: 64 MMHG | SYSTOLIC BLOOD PRESSURE: 132 MMHG | TEMPERATURE: 97.1 F

## 2025-06-10 DIAGNOSIS — L03.011 CELLULITIS OF FINGER OF RIGHT HAND: ICD-10-CM

## 2025-06-10 DIAGNOSIS — S61.451A CAT BITE OF MULTIPLE SITES OF RIGHT HAND AND FINGERS, INITIAL ENCOUNTER: Primary | ICD-10-CM

## 2025-06-10 DIAGNOSIS — S61.259A CAT BITE OF MULTIPLE SITES OF RIGHT HAND AND FINGERS, INITIAL ENCOUNTER: Primary | ICD-10-CM
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
## 2025-06-10 PROCEDURE — 1036F TOBACCO NON-USER: CPT | Performed by: FAMILY MEDICINE

## 2025-06-10 PROCEDURE — 1160F RVW MEDS BY RX/DR IN RCRD: CPT | Performed by: FAMILY MEDICINE

## 2025-06-10 PROCEDURE — 1159F MED LIST DOCD IN RCRD: CPT | Performed by: FAMILY MEDICINE

## 2025-06-10 PROCEDURE — 99213 OFFICE O/P EST LOW 20 MIN: CPT | Performed by: FAMILY MEDICINE

## 2025-06-10 PROCEDURE — 3078F DIAST BP <80 MM HG: CPT | Performed by: FAMILY MEDICINE

## 2025-06-10 PROCEDURE — G2211 COMPLEX E/M VISIT ADD ON: HCPCS | Performed by: FAMILY MEDICINE

## 2025-06-10 PROCEDURE — 3075F SYST BP GE 130 - 139MM HG: CPT | Performed by: FAMILY MEDICINE

## 2025-06-10 PROCEDURE — 1126F AMNT PAIN NOTED NONE PRSNT: CPT | Performed by: FAMILY MEDICINE

## 2025-06-10 RX ORDER — DOXYCYCLINE HYCLATE 100 MG
100 TABLET ORAL 2 TIMES DAILY
Qty: 14 TABLET | Refills: 0 | Status: SHIPPED | OUTPATIENT
Start: 2025-06-10 | End: 2025-06-17

## 2025-06-10 RX ORDER — METRONIDAZOLE 500 MG/1
500 TABLET ORAL 2 TIMES DAILY
Qty: 14 TABLET | Refills: 0 | Status: SHIPPED | OUTPATIENT
Start: 2025-06-10 | End: 2025-06-17

## 2025-06-10 ASSESSMENT — PATIENT HEALTH QUESTIONNAIRE - PHQ9
2. FEELING DOWN, DEPRESSED OR HOPELESS: NOT AT ALL
1. LITTLE INTEREST OR PLEASURE IN DOING THINGS: NOT AT ALL
SUM OF ALL RESPONSES TO PHQ9 QUESTIONS 1 AND 2: 0

## 2025-06-10 ASSESSMENT — ENCOUNTER SYMPTOMS
OCCASIONAL FEELINGS OF UNSTEADINESS: 0
DEPRESSION: 0
LOSS OF SENSATION IN FEET: 0

## 2025-06-10 ASSESSMENT — PAIN SCALES - GENERAL: PAINLEVEL_OUTOF10: 0-NO PAIN

## 2025-06-10 NOTE — PROGRESS NOTES
Subjective   Patient ID: Price Perez is a 89 y.o. male who presents for Follow-up (Cat Bite R. Hand).    HPI   Cat bite to right hand 2 days ago and scratches of his right forearm  Cat is his cat and is up-to-date on all his vaccines/immunizations  Denies any fevers or chills  Been cleaning with peroxide and water  Cat bite is over his right thumb and at the first metacarpal phalangeal joint.  There is no lymphangitic spread    Review of Systems    Objective   /64   Pulse 88   Temp 36.2 °C (97.1 °F) (Temporal)   Wt 78.3 kg (172 lb 9.6 oz)   SpO2 96%   BMI 24.42 kg/m²     Physical Exam  Vitals reviewed.   Constitutional:       Appearance: Normal appearance.   Cardiovascular:      Rate and Rhythm: Normal rate and regular rhythm.      Heart sounds: Normal heart sounds. No murmur heard.  Pulmonary:      Breath sounds: Normal breath sounds.   Abdominal:      General: Abdomen is flat. Bowel sounds are normal.      Palpations: Abdomen is soft.   Musculoskeletal:         General: No swelling.   Neurological:      Mental Status: He is alert.     Open wound dorsal aspect of right hand without any signs of infection  Mild erythema over the second medic carpal phalangeal joint without any lymphangitic spread, full range of motion in flexion extension of all digits  Multiple scratches are noted on his right forearm without any signs of infection    Assessment/Plan   Diagnoses and all orders for this visit:  Cat bite of multiple sites of right hand and fingers, initial encounter  -     doxycycline (Vibra-Tabs) 100 mg tablet; Take 1 tablet (100 mg) by mouth 2 times a day for 7 days. Take with a full glass of water and do not lie down for at least 30 minutes after.  -     metroNIDAZOLE (Flagyl) 500 mg tablet; Take 1 tablet (500 mg) by mouth 2 times a day for 7 days.  Cellulitis of finger of right hand    Wash with plain soap and water  No peroxide or rubbing alcohol  May use topical antibiotic ointment  Due to his  penicillin allergy, recommend starting doxycycline 100 mg twice daily for 7 days and metronidazole 500 mg twice daily x 7 days  Return to the office on Friday for a recheck  If signs and symptoms worsen such as lymphangitic spread fevers he is to call let me know  All questions were answered

## 2025-06-13 ENCOUNTER — OFFICE VISIT (OUTPATIENT)
Dept: PRIMARY CARE | Facility: CLINIC | Age: OVER 89
End: 2025-06-13
Payer: MEDICARE

## 2025-06-13 VITALS
WEIGHT: 173 LBS | DIASTOLIC BLOOD PRESSURE: 72 MMHG | SYSTOLIC BLOOD PRESSURE: 130 MMHG | OXYGEN SATURATION: 96 % | BODY MASS INDEX: 24.47 KG/M2 | TEMPERATURE: 97.3 F | HEART RATE: 80 BPM

## 2025-06-13 DIAGNOSIS — E03.9 ACQUIRED HYPOTHYROIDISM: Chronic | ICD-10-CM

## 2025-06-13 DIAGNOSIS — S61.259A CAT BITE OF MULTIPLE SITES OF RIGHT HAND AND FINGERS, INITIAL ENCOUNTER: ICD-10-CM

## 2025-06-13 DIAGNOSIS — S61.451A CAT BITE OF MULTIPLE SITES OF RIGHT HAND AND FINGERS, INITIAL ENCOUNTER: ICD-10-CM

## 2025-06-13 DIAGNOSIS — K21.9 GASTROESOPHAGEAL REFLUX DISEASE, UNSPECIFIED WHETHER ESOPHAGITIS PRESENT: Chronic | ICD-10-CM

## 2025-06-13 DIAGNOSIS — N18.31 TYPE 2 DIABETES MELLITUS WITH STAGE 3A CHRONIC KIDNEY DISEASE, WITH LONG-TERM CURRENT USE OF INSULIN (MULTI): Primary | Chronic | ICD-10-CM

## 2025-06-13 DIAGNOSIS — E78.2 MIXED HYPERLIPIDEMIA: Chronic | ICD-10-CM
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
## 2025-06-13 DIAGNOSIS — E11.9 TYPE 2 DIABETES MELLITUS WITHOUT COMPLICATION, WITHOUT LONG-TERM CURRENT USE OF INSULIN: ICD-10-CM

## 2025-06-13 DIAGNOSIS — E11.22 TYPE 2 DIABETES MELLITUS WITH STAGE 3A CHRONIC KIDNEY DISEASE, WITH LONG-TERM CURRENT USE OF INSULIN (MULTI): Primary | Chronic | ICD-10-CM

## 2025-06-13 DIAGNOSIS — Z79.4 TYPE 2 DIABETES MELLITUS WITH CHRONIC KIDNEY DISEASE, WITH LONG-TERM CURRENT USE OF INSULIN, UNSPECIFIED CKD STAGE (MULTI): Chronic | ICD-10-CM

## 2025-06-13 DIAGNOSIS — L56.8 PHOTODERMATITIS: ICD-10-CM

## 2025-06-13 DIAGNOSIS — E11.22 TYPE 2 DIABETES MELLITUS WITH CHRONIC KIDNEY DISEASE, WITH LONG-TERM CURRENT USE OF INSULIN, UNSPECIFIED CKD STAGE (MULTI): Chronic | ICD-10-CM

## 2025-06-13 DIAGNOSIS — I10 PRIMARY HYPERTENSION: Chronic | ICD-10-CM

## 2025-06-13 DIAGNOSIS — L03.011 CELLULITIS OF FINGER OF RIGHT HAND: ICD-10-CM

## 2025-06-13 DIAGNOSIS — Z79.4 TYPE 2 DIABETES MELLITUS WITH STAGE 3A CHRONIC KIDNEY DISEASE, WITH LONG-TERM CURRENT USE OF INSULIN (MULTI): Primary | Chronic | ICD-10-CM

## 2025-06-13 LAB — POC HEMOGLOBIN A1C: 7.7 % (ref 4.2–6.5)

## 2025-06-13 PROCEDURE — 99214 OFFICE O/P EST MOD 30 MIN: CPT | Performed by: FAMILY MEDICINE

## 2025-06-13 PROCEDURE — 99214 OFFICE O/P EST MOD 30 MIN: CPT | Mod: 25 | Performed by: FAMILY MEDICINE

## 2025-06-13 PROCEDURE — 1159F MED LIST DOCD IN RCRD: CPT | Performed by: FAMILY MEDICINE

## 2025-06-13 PROCEDURE — 1126F AMNT PAIN NOTED NONE PRSNT: CPT | Performed by: FAMILY MEDICINE

## 2025-06-13 PROCEDURE — 3075F SYST BP GE 130 - 139MM HG: CPT | Performed by: FAMILY MEDICINE

## 2025-06-13 PROCEDURE — 83036 HEMOGLOBIN GLYCOSYLATED A1C: CPT | Mod: QW | Performed by: FAMILY MEDICINE

## 2025-06-13 PROCEDURE — 3078F DIAST BP <80 MM HG: CPT | Performed by: FAMILY MEDICINE

## 2025-06-13 PROCEDURE — 1036F TOBACCO NON-USER: CPT | Performed by: FAMILY MEDICINE

## 2025-06-13 PROCEDURE — 1160F RVW MEDS BY RX/DR IN RCRD: CPT | Performed by: FAMILY MEDICINE

## 2025-06-13 RX ORDER — REPAGLINIDE 2 MG/1
TABLET ORAL
Qty: 270 TABLET | Refills: 1 | Status: SHIPPED | OUTPATIENT
Start: 2025-06-13

## 2025-06-13 RX ORDER — METFORMIN HYDROCHLORIDE 500 MG/1
1000 TABLET ORAL 2 TIMES DAILY
Qty: 360 TABLET | Refills: 3 | Status: SHIPPED | OUTPATIENT
Start: 2025-06-13

## 2025-06-13 ASSESSMENT — ENCOUNTER SYMPTOMS
PALPITATIONS: 0
LOSS OF SENSATION IN FEET: 0
DEPRESSION: 0
COUGH: 0
SHORTNESS OF BREATH: 0
OCCASIONAL FEELINGS OF UNSTEADINESS: 0
ABDOMINAL DISTENTION: 0

## 2025-06-13 ASSESSMENT — PAIN SCALES - GENERAL: PAINLEVEL_OUTOF10: 0-NO PAIN

## 2025-06-13 NOTE — PROGRESS NOTES
Subjective   Patient ID: Price Perez is a 89 y.o. male who presents for Diabetes.    HPI   DM2:   -Chronic , stable  -A1c today:  7.7  - Frequency of blood glucose monitoring: fbg < 150,  2 hrs pp< 200  -Hypoglycemic episodes: infrequent  -Concerns:  none  -Tolerating meds: yes    Hypertension :   -Patient is here for follow-up hypertension: chronic , stable off all meds since gb surgery  Dyslipidemia  -Patient presents for follow up chronic ,stable problem  -Concerns: none   -Treatment:    Hypothyroidism:  -Chronic stable problem  -Tolerating medication  -Concerns: none  Lately cellulitis right-handed, tolerating antibiotics as directed, denies any redness or warmth or fevers, having full range of motion in his hand and all phalanges      Review of Systems   Respiratory:  Negative for cough and shortness of breath.    Cardiovascular:  Negative for chest pain and palpitations.   Gastrointestinal:  Negative for abdominal distention.       Objective   /72   Pulse 80   Temp 36.3 °C (97.3 °F) (Temporal)   Wt 78.5 kg (173 lb)   SpO2 96%   BMI 24.47 kg/m²     Physical Exam  Vitals reviewed.   Constitutional:       Appearance: Normal appearance.   Cardiovascular:      Rate and Rhythm: Normal rate and regular rhythm.      Heart sounds: Normal heart sounds. No murmur heard.  Pulmonary:      Breath sounds: Normal breath sounds.   Abdominal:      General: Abdomen is flat. Bowel sounds are normal.      Palpations: Abdomen is soft.   Musculoskeletal:         General: No swelling.   Neurological:      Mental Status: He is alert.     R hand cellulitis improved, from , no lymphangetic spread, minimal pain on palpation    Assessment/Plan   Diagnoses and all orders for this visit:  Type 2 diabetes mellitus with stage 3a chronic kidney disease, with long-term current use of insulin (Multi)  -     POCT glycosylated hemoglobin (Hb A1C) manually resulted  Primary hypertension  Gastroesophageal reflux disease, unspecified  whether esophagitis present  Mixed hyperlipidemia  Acquired hypothyroidism  Other orders  -     3 Month Follow Up In Primary Care    Continue medications as directed  Continue with wound care by washing wounds with soap and water  Finish up antibiotics  Return to office in 3 months

## 2025-09-02 DIAGNOSIS — E11.22 TYPE 2 DIABETES MELLITUS WITH CHRONIC KIDNEY DISEASE, WITH LONG-TERM CURRENT USE OF INSULIN, UNSPECIFIED CKD STAGE (MULTI): ICD-10-CM

## 2025-09-02 DIAGNOSIS — Z79.4 TYPE 2 DIABETES MELLITUS WITH CHRONIC KIDNEY DISEASE, WITH LONG-TERM CURRENT USE OF INSULIN, UNSPECIFIED CKD STAGE (MULTI): ICD-10-CM

## 2025-09-02 RX ORDER — LOVASTATIN 40 MG/1
40 TABLET ORAL
Qty: 90 TABLET | Refills: 0 | Status: SHIPPED | OUTPATIENT
Start: 2025-09-02

## 2025-09-15 ENCOUNTER — APPOINTMENT (OUTPATIENT)
Dept: ENDOCRINOLOGY | Facility: CLINIC | Age: OVER 89
End: 2025-09-15
Payer: MEDICARE